# Patient Record
Sex: MALE | HISPANIC OR LATINO | Employment: FULL TIME
[De-identification: names, ages, dates, MRNs, and addresses within clinical notes are randomized per-mention and may not be internally consistent; named-entity substitution may affect disease eponyms.]

---

## 2020-11-26 ENCOUNTER — RECORDS - HEALTHEAST (OUTPATIENT)
Dept: ADMINISTRATIVE | Facility: OTHER | Age: 25
End: 2020-11-26

## 2020-12-03 ENCOUNTER — COMMUNICATION - HEALTHEAST (OUTPATIENT)
Dept: TELEHEALTH | Facility: CLINIC | Age: 25
End: 2020-12-03

## 2020-12-03 ENCOUNTER — OFFICE VISIT - HEALTHEAST (OUTPATIENT)
Dept: FAMILY MEDICINE | Facility: CLINIC | Age: 25
End: 2020-12-03

## 2020-12-03 DIAGNOSIS — K29.00 ACUTE GASTRITIS WITHOUT HEMORRHAGE, UNSPECIFIED GASTRITIS TYPE: ICD-10-CM

## 2020-12-03 LAB
ALBUMIN SERPL-MCNC: 4.3 G/DL (ref 3.5–5)
ALP SERPL-CCNC: 95 U/L (ref 45–120)
ALT SERPL W P-5'-P-CCNC: 169 U/L (ref 0–45)
ANION GAP SERPL CALCULATED.3IONS-SCNC: 8 MMOL/L (ref 5–18)
AST SERPL W P-5'-P-CCNC: 73 U/L (ref 0–40)
BASOPHILS # BLD AUTO: 0 THOU/UL (ref 0–0.2)
BASOPHILS NFR BLD AUTO: 0 % (ref 0–2)
BILIRUB SERPL-MCNC: 0.3 MG/DL (ref 0–1)
BUN SERPL-MCNC: 15 MG/DL (ref 8–22)
CALCIUM SERPL-MCNC: 9.3 MG/DL (ref 8.5–10.5)
CHLORIDE BLD-SCNC: 103 MMOL/L (ref 98–107)
CO2 SERPL-SCNC: 29 MMOL/L (ref 22–31)
CREAT SERPL-MCNC: 0.92 MG/DL (ref 0.7–1.3)
EOSINOPHIL # BLD AUTO: 0.1 THOU/UL (ref 0–0.4)
EOSINOPHIL NFR BLD AUTO: 2 % (ref 0–6)
ERYTHROCYTE [DISTWIDTH] IN BLOOD BY AUTOMATED COUNT: 12.7 % (ref 11–14.5)
GFR SERPL CREATININE-BSD FRML MDRD: >60 ML/MIN/1.73M2
GLUCOSE BLD-MCNC: 102 MG/DL (ref 70–125)
HCT VFR BLD AUTO: 40.5 % (ref 40–54)
HGB BLD-MCNC: 13.9 G/DL (ref 14–18)
LIPASE SERPL-CCNC: 26 U/L (ref 0–52)
LYMPHOCYTES # BLD AUTO: 1.5 THOU/UL (ref 0.8–4.4)
LYMPHOCYTES NFR BLD AUTO: 29 % (ref 20–40)
MCH RBC QN AUTO: 29.3 PG (ref 27–34)
MCHC RBC AUTO-ENTMCNC: 34.2 G/DL (ref 32–36)
MCV RBC AUTO: 85 FL (ref 80–100)
MONOCYTES # BLD AUTO: 0.4 THOU/UL (ref 0–0.9)
MONOCYTES NFR BLD AUTO: 8 % (ref 2–10)
NEUTROPHILS # BLD AUTO: 3 THOU/UL (ref 2–7.7)
NEUTROPHILS NFR BLD AUTO: 60 % (ref 50–70)
PLATELET # BLD AUTO: 337 THOU/UL (ref 140–440)
PMV BLD AUTO: 7.2 FL (ref 7–10)
POTASSIUM BLD-SCNC: 3.9 MMOL/L (ref 3.5–5)
PROT SERPL-MCNC: 7.2 G/DL (ref 6–8)
RBC # BLD AUTO: 4.74 MILL/UL (ref 4.4–6.2)
SODIUM SERPL-SCNC: 140 MMOL/L (ref 136–145)
WBC: 5 THOU/UL (ref 4–11)

## 2020-12-03 ASSESSMENT — MIFFLIN-ST. JEOR: SCORE: 1994.25

## 2020-12-04 ENCOUNTER — COMMUNICATION - HEALTHEAST (OUTPATIENT)
Dept: FAMILY MEDICINE | Facility: CLINIC | Age: 25
End: 2020-12-04

## 2020-12-16 ENCOUNTER — COMMUNICATION - HEALTHEAST (OUTPATIENT)
Dept: FAMILY MEDICINE | Facility: CLINIC | Age: 25
End: 2020-12-16

## 2021-05-03 ENCOUNTER — COMMUNICATION - HEALTHEAST (OUTPATIENT)
Dept: PHYSICAL MEDICINE AND REHAB | Facility: CLINIC | Age: 26
End: 2021-05-03

## 2021-06-05 VITALS
HEIGHT: 70 IN | BODY MASS INDEX: 31.94 KG/M2 | SYSTOLIC BLOOD PRESSURE: 118 MMHG | OXYGEN SATURATION: 96 % | DIASTOLIC BLOOD PRESSURE: 76 MMHG | TEMPERATURE: 98.2 F | HEART RATE: 83 BPM | WEIGHT: 223.1 LBS

## 2021-06-13 NOTE — TELEPHONE ENCOUNTER
We can reiterate that these labs are just slightly off, and not something I am worried about, but we can just recheck them in a few weeks.    TS

## 2021-06-13 NOTE — PROGRESS NOTES
"ASSESSMENT:  1. Acute gastritis without hemorrhage, unspecified gastritis type    I am not convinced that he had a pancreatitis at all, and I think he probably has more of a reflux or gastritis issue going on at this point.  His lipase was normal and his CT was really quite borderline and the radiologist suggested \"correlating with the lipase\" which was normal as mentioned.  I put him on some omeprazole twice a day every day for about a month and asked him to come back in about 4 to 6 weeks and see how he is doing on that.  We may need to send him to GI for an endoscopy if he is not getting better after that.  We also did recheck some of the labs that he had done in the hospital as well.      - omeprazole (PRILOSEC) 20 MG capsule; Take 1 capsule (20 mg total) by mouth 2 (two) times a day before meals.  Dispense: 60 capsule; Refill: 3  - Lipase  - HM1(CBC and Differential)  - Comprehensive Metabolic Panel          PLAN:  There are no Patient Instructions on file for this visit.    Orders Placed This Encounter   Procedures     Lipase     Comprehensive Metabolic Panel     HM1 (CBC with Diff)     There are no discontinued medications.    No follow-ups on file.    CHIEF COMPLAINT:  Chief Complaint   Patient presents with     Follow-up      NEW PATIENT INF [facility] ADMIT 11/26/20 DISCHARGED 11/27/20 MILD PANCREATITIS  ABDOMINAL PAIN  PATIENT ASSIGNED TO DR MARLEE SAUL THROUGH [facility] [place]. - fatigued - no want to do anything - was told he has amalrotation of organ? CT scan was done at [facility] in [place] then was sent to [facility]. Constant diarrhea x1 week       HISTORY OF PRESENT ILLNESS:  Froy is a 25 y.o. male presenting to the clinic today for a hospital follow-up.  He was having some epigastric discomfort back on November 25, and then it seemed to get a bit worse and on the next day, he went into the urgency room and was sent up to [facility] for CT and ended up getting admitted.  The thought that he might " "have some pancreatitis because of some equivocal finding on the CT scan, but he had a normal lipase and they kept him n.p.o. overnight and he seemed to get a bit better so he was discharged home on .  He has gotten some better a bit, but he still has some epigastric discomfort that occasionally will go straight through to the back.  He is eating more normal diet at this point.  He had had some diarrhea for some time but that seems to be slowly getting better as well.  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    REVIEW OF SYSTEMS:     All other systems are negative.    PFSH:    Reviewed    Patient is new patient to the clinic. Please see past medical history, surgical history, social history and family history, all of which were completed in their entirety today.     TOBACCO USE:  Social History     Tobacco Use   Smoking Status Former Smoker     Quit date: 2020     Years since quittin.0   Smokeless Tobacco Never Used       VITALS:  Vitals:    20 1239   BP: 118/76   Patient Site: Left Arm   Patient Position: Sitting   Cuff Size: Adult Large   Pulse: 83   Temp: 98.2  F (36.8  C)   SpO2: 96%   Weight: (!) 223 lb 1.6 oz (101.2 kg)   Height: 5' 9.75\" (1.772 m)     Wt Readings from Last 3 Encounters:   20 (!) 223 lb 1.6 oz (101.2 kg)     Body mass index is 32.24 kg/m .    PHYSICAL EXAM:  Constitutional:  Well appearing patient in no acute distress.  Vitals:  Per nursing notes.    HEENT:  Normocephalic, atraumatic.  Ears are clear bilaterally, with no fluid or redness, and landmarks visible.  Pupils are equal and reactive to light, extraocular muscles intact, visual fields are full.  Nose is normal, and oropharynx is clear without redness.    Neck is without lymphadenopathy.    Lungs:  Clear to auscultation bilaterally without wheezes, rales or " rhonchi.   Cardiac:  Regular rate and rhythm without murmurs, rubs, or gallops.     Abdomen is soft slightly tender in the epigastrium but no guarding or rebound is present.  Bowel sounds present all quadrants.    Legs show no cyanosis, clubbing or edema.  Palpation of the distal pulses are normal and symmetric.    Neurologic:  Cranial nerves II-XII intact.   Normal and symmetric reflexes in extremities, with normal strength and sensation.  Psychiatric:  Mood appropriate, memory intact.       ADDITIONAL HISTORY SUMMARIZED (2): 2  CARE EVERYWHERE/ EXTRA INFORMATION (1): None.   RADIOLOGY TESTS (1): 1  LABS (1): 1  CARDIOLOGY/MEDICINE TESTS (1): 0  INDEPENDENT REVIEW (2 each): None.     Total data points:4          MEDICATIONS:  Current Outpatient Medications   Medication Sig Dispense Refill     omeprazole (PRILOSEC) 20 MG capsule Take 1 capsule (20 mg total) by mouth 2 (two) times a day before meals. 60 capsule 3     No current facility-administered medications for this visit.

## 2021-06-13 NOTE — TELEPHONE ENCOUNTER
Test Results  Who is calling?:  Patient   Who ordered the test:  Bobby Myers MD  Type of test: Lab  Date of test:  12/03/20  Where was the test performed:  [facility]   What are your questions/concerns?:  Patient requested for results writer relayed providers message . Patient have some concerns about his lab results requested a call from provider .  Okay to leave a detailed message?:  No    Lipase is normal, liver tests are just borderline, but we can recheck them in a month when you come back in for a   recheck.      Please call with questions or contact us using [employer].     Sincerely,          Electronically signed by Bobby Myers MD

## 2021-06-21 NOTE — LETTER
Letter by Cindy Love at      Author: Cindy Love Service: -- Author Type: --    Filed:  Encounter Date: 5/3/2021 Status: (Other)         Froy Hernández  [address] [place] [id number]      May 3, 2021      Dear Mr. Stanislaw Hernández,    At [facility], we care about your health and well-being. Recently, we received a  referral to get you scheduled at the [facility]. We have attempted to  assist you in scheduling this appointment and have been unsuccessful in reaching you.    Please call our Intake line at your earliest convenience for assistance in scheduling an  Appointment and check in with you insurance if you need a referral to be seen from your  primary clinic. Thank you for choosing [facility].      Sincerely,        [facility] Intake team  [phone]

## 2021-06-21 NOTE — LETTER
Letter by Bobby Myers MD at      Author: Bobby Myers MD Service: -- Author Type: --    Filed:  Encounter Date: 12/4/2020 Status: (Other)         Froy Hernández  [address]  [place] [address]             December 4, 2020         Dear Mr. Stanislaw Hernández,    Below are the results from your recent visit:    Resulted Orders   Lipase   Result Value Ref Range    Lipase 26 0 - 52 U/L   Comprehensive Metabolic Panel   Result Value Ref Range    Sodium 140 136 - 145 mmol/L    Potassium 3.9 3.5 - 5.0 mmol/L    Chloride 103 98 - 107 mmol/L    CO2 29 22 - 31 mmol/L    Anion Gap, Calculation 8 5 - 18 mmol/L    Glucose 102 70 - 125 mg/dL    BUN 15 8 - 22 mg/dL    Creatinine 0.92 0.70 - 1.30 mg/dL    GFR MDRD Af Amer >60 >60 mL/min/1.73m2    GFR MDRD Non Af Amer >60 >60 mL/min/1.73m2    Bilirubin, Total 0.3 0.0 - 1.0 mg/dL    Calcium 9.3 8.5 - 10.5 mg/dL    Protein, Total 7.2 6.0 - 8.0 g/dL    Albumin 4.3 3.5 - 5.0 g/dL    Alkaline Phosphatase 95 45 - 120 U/L    AST 73 (H) 0 - 40 U/L     (H) 0 - 45 U/L    Narrative    Fasting Glucose reference range is 70-99 mg/dL per  American Diabetes Association (ADA) guidelines.   HM1 (CBC with Diff)   Result Value Ref Range    WBC 5.0 4.0 - 11.0 thou/uL    RBC 4.74 4.40 - 6.20 mill/uL    Hemoglobin 13.9 (L) 14.0 - 18.0 g/dL    Hematocrit 40.5 40.0 - 54.0 %    MCV 85 80 - 100 fL    MCH 29.3 27.0 - 34.0 pg    MCHC 34.2 32.0 - 36.0 g/dL    RDW 12.7 11.0 - 14.5 %    Platelets 337 140 - 440 thou/uL    MPV 7.2 7.0 - 10.0 fL    Neutrophils % 60 50 - 70 %    Lymphocytes % 29 20 - 40 %    Monocytes % 8 2 - 10 %    Eosinophils % 2 0 - 6 %    Basophils % 0 0 - 2 %    Neutrophils Absolute 3.0 2.0 - 7.7 thou/uL    Lymphocytes Absolute 1.5 0.8 - 4.4 thou/uL    Monocytes Absolute 0.4 0.0 - 0.9 thou/uL    Eosinophils Absolute 0.1 0.0 - 0.4 thou/uL    Basophils Absolute 0.0 0.0 - 0.2 thou/uL        Lipase is normal, liver tests are just borderline, but we can recheck them  in a month when you come back in for a  recheck.     Please call with questions or contact us using [employer].    Sincerely,        Electronically signed by Bobby Myers MD

## 2021-08-30 ENCOUNTER — OFFICE VISIT (OUTPATIENT)
Dept: FAMILY MEDICINE | Facility: CLINIC | Age: 26
End: 2021-08-30
Payer: OTHER GOVERNMENT

## 2021-08-30 VITALS
HEART RATE: 90 BPM | SYSTOLIC BLOOD PRESSURE: 114 MMHG | BODY MASS INDEX: 32.86 KG/M2 | DIASTOLIC BLOOD PRESSURE: 70 MMHG | WEIGHT: 229 LBS | OXYGEN SATURATION: 97 %

## 2021-08-30 DIAGNOSIS — L30.9 ECZEMA, UNSPECIFIED TYPE: Primary | ICD-10-CM

## 2021-08-30 DIAGNOSIS — M54.50 CHRONIC BILATERAL LOW BACK PAIN WITHOUT SCIATICA: ICD-10-CM

## 2021-08-30 DIAGNOSIS — M25.572 PAIN IN JOINT, ANKLE AND FOOT, LEFT: ICD-10-CM

## 2021-08-30 DIAGNOSIS — G89.29 CHRONIC BILATERAL LOW BACK PAIN WITHOUT SCIATICA: ICD-10-CM

## 2021-08-30 PROCEDURE — 99214 OFFICE O/P EST MOD 30 MIN: CPT | Performed by: FAMILY MEDICINE

## 2021-08-30 RX ORDER — FLUOCINONIDE CREAM (EMULSIFIED BASE) 0.5 MG/G
CREAM TOPICAL
Qty: 60 G | Refills: 0 | Status: SHIPPED | OUTPATIENT
Start: 2021-08-30 | End: 2023-01-25

## 2021-08-31 NOTE — PROGRESS NOTES
"    Assessment & Plan     Eczema, unspecified type    I gave him some of the cream as listed below.  We can try that for a while to see if it clears it up.  I would think that it would as it looks like a reactive dermatitis to something.  He is unsure exactly what it might have been from but regardless it should work for him and if it does not he will let us know.      - fluocinonide emulsified base 0.05 % external cream; Aoply to affected areas BID until clear    Chronic bilateral low back pain without sciatica    His back is been bothering him long enough now and is not getting been getting any better, but I think is reasonable to get an MRI scan of the back just to see what is going on anatomically, and make sure there is no arthritis or spondylolisthesis or any other structural abnormalities that would be concerning.  He does not have sciatica so I am not too concerned about a nerve impingement we can also see if that is part of the issue and follow-up on the results when the become available.      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      - Orthopedic  Referral; Future    Review of the result(s) of each unique test - MRI  Ordering of each unique test  Prescription drug management         BMI:   Estimated body mass index is 32.86 kg/m  as calculated from the following:    Height as of 4/25/21: 1.778 m (5' 10\").    Weight as of this encounter: 103.9 kg (229 lb).           No follow-ups on file.    Bobby Myers MD  [facility] BERNY Mcduffie is a 25 year old who presents for the following health issues     HPI " "    Patient is here today with several concerns.  First off he like to take a look at his back.  He continues to have some back pain but is not getting any better.  He has had some back pain ongoing for quite some time.  He has been through therapy and multiple other treatment attempts to try to get better but he is certainly still having a lot of pain in the lumbar area.  Sometimes will radiate down into his upper buttock but never anything past the gluteal crease or down into the legs.  No numbness or tingling or weakness into the legs, but he is just frustrated that is not getting any better.    Also has had some trouble with his left ankle.  He had surgery in [place] in 2020 because he injured it probably sounding like a eversion injury.  He does not think that they did anything to the ligaments but he states that there was some's \"bony tissue\" that was removed but now he is having a lot of problems with it and almost daily pain with it.    This is an issue for him both of these things above, with his active  duty and he just wants to have it looked at.    He also has had a bit of pruritic rash on his medial right upper arm for the last couple of days.  He is not sure when he might have been exposed to but it is quite bothersome to him.      Review of Systems   Constitutional, HEENT, cardiovascular, pulmonary, gi and gu systems are negative, except as otherwise noted.      Objective    /70 (BP Location: Left arm, Patient Position: Sitting, Cuff Size: Adult Large)   Pulse 90   Wt 103.9 kg (229 lb)   SpO2 97%   BMI 32.86 kg/m    Body mass index is 32.86 kg/m .  Physical Exam   GENERAL: healthy, alert and no distress  NECK: no adenopathy, no asymmetry, masses, or scars and thyroid normal to palpation  RESP: lungs clear to auscultation - no rales, rhonchi or wheezes  CV: regular rate and rhythm, normal S1 S2, no S3 or S4, no murmur, click or rub, no peripheral edema and peripheral pulses " strong  ABDOMEN: soft, nontender, no hepatosplenomegaly, no masses and bowel sounds normal  Oh back shows pain to flexion and extension as well as lateral flexion.  No sciatic notch tenderness.  Negative straight leg raise.  Good strength sensation reflexes in all 4 extremities.  His ankle shows some pain with inversion and some asymmetry just anterior to the medial malleolus with what seems to be a bit of a pit anteriorly to that which is different from the other side.  He has had a dermatitic rash as described above.    No results found for any visits on 08/30/21.

## 2021-09-08 ENCOUNTER — OFFICE VISIT (OUTPATIENT)
Dept: PODIATRY | Facility: CLINIC | Age: 26
End: 2021-09-08
Attending: FAMILY MEDICINE
Payer: OTHER GOVERNMENT

## 2021-09-08 VITALS — HEIGHT: 70 IN | BODY MASS INDEX: 31.78 KG/M2 | OXYGEN SATURATION: 96 % | HEART RATE: 89 BPM | WEIGHT: 222 LBS

## 2021-09-08 DIAGNOSIS — M25.572 PAIN IN JOINT, ANKLE AND FOOT, LEFT: ICD-10-CM

## 2021-09-08 PROCEDURE — 99244 OFF/OP CNSLTJ NEW/EST MOD 40: CPT | Performed by: PODIATRIST

## 2021-09-08 ASSESSMENT — PAIN SCALES - GENERAL: PAINLEVEL: MILD PAIN (3)

## 2021-09-08 ASSESSMENT — MIFFLIN-ST. JEOR: SCORE: 1993.24

## 2021-09-08 NOTE — LETTER
9/8/2021         RE: Froy Hernández  [address]  [place] [address]        Dear Colleague,    Thank you for referring your patient, Froy Hernández, to the [facility]. Please see a copy of my visit note below.    FOOT AND ANKLE SURGERY/PODIATRY CONSULT NOTE         ASSESSMENT:   Left foot and ankle pain      TREATMENT:  An x-ray of the left foot was taken today.  I informed the patient that it does appear that he may have some degenerative changes of the subtalar joint particularly the middle facet.  On the oblique view it does show a large circular radiolucent area within the sinus tarsi.  I am recommending an MRI of the left foot to rule out bone tumors or any other pathology that is not definitive on plain film.  Also want to examine the anterior talofibular ligament to rule out rupture.  The patient is to return to the clinic in 1 week to discuss the findings of the MRI.        HPI: I was asked to see Froy Hernández today to evaluate chronic left foot pain.  The patient indicated that he has had severe pain involving his left foot for several years.  He is extremely active.  He likes to play basketball, football and other sports activities.  He has suffered several ankle sprains.  He subsequently had ankle arthroscopy of the left ankle after he was informed he had some degenerative changes in the ankle joint.  He stated this surgical procedure was unsuccessful eliminating his pain.  He stated that his pain is has gotten progressively worse over the years.  It is difficult to walk and weight-bear without pain.  He describes it as a severe aching type pain.  It does alter his gait quite a bit.  He has noticed some swelling of the left foot and ankle as well.  He has not had any recent trauma to the left foot or the left ankle..  The patient was seen in consultation at the request of Bobby Myers MD for evaluation and treatment of left  foot and left ankle pain.     History reviewed. No pertinent past medical history.    Social History     Socioeconomic History     Marital status:      Spouse name: Not on file     Number of children: Not on file     Years of education: Not on file     Highest education level: Not on file   Occupational History     Not on file   Tobacco Use     Smoking status: Former Smoker     Quit date: 2020     Years since quittin.7     Smokeless tobacco: Never Used     Tobacco comment: e-cig   Substance and Sexual Activity     Alcohol use: Not Currently     Drug use: Not on file     Sexual activity: Yes     Partners: Female   Other Topics Concern     Not on file   Social History Narrative     Not on file     Social Determinants of Health     Financial Resource Strain:      Difficulty of Paying Living Expenses:    Food Insecurity:      Worried About Running Out of Food in the Last Year:      Ran Out of Food in the Last Year:    Transportation Needs:      Lack of Transportation (Medical):      Lack of Transportation (Non-Medical):    Physical Activity:      Days of Exercise per Week:      Minutes of Exercise per Session:    Stress:      Feeling of Stress :    Social Connections:      Frequency of Communication with Friends and Family:      Frequency of Social Gatherings with Friends and Family:      Attends [employer] Services:      Active Member of Clubs or Organizations:      Attends Club or Organization Meetings:      Marital Status:    Intimate Partner Violence:      Fear of Current or Ex-Partner:      Emotionally Abused:      Physically Abused:      Sexually Abused:           Allergies   Allergen Reactions     Penicillins Unknown     Other reaction(s): *Unknown          Current Outpatient Medications:      fluocinonide emulsified base 0.05 % external cream, Aoply to affected areas BID until clear, Disp: 60 g, Rfl: 0     No family history on file.     Social History     Socioeconomic History     Marital status:       Spouse name: Not on file     Number of children: Not on file     Years of education: Not on file     Highest education level: Not on file   Occupational History     Not on file   Tobacco Use     Smoking status: Former Smoker     Quit date: 2020     Years since quittin.7     Smokeless tobacco: Never Used     Tobacco comment: e-cig   Substance and Sexual Activity     Alcohol use: Not Currently     Drug use: Not on file     Sexual activity: Yes     Partners: Female   Other Topics Concern     Not on file   Social History Narrative     Not on file     Social Determinants of Health     Financial Resource Strain:      Difficulty of Paying Living Expenses:    Food Insecurity:      Worried About Running Out of Food in the Last Year:      Ran Out of Food in the Last Year:    Transportation Needs:      Lack of Transportation (Medical):      Lack of Transportation (Non-Medical):    Physical Activity:      Days of Exercise per Week:      Minutes of Exercise per Session:    Stress:      Feeling of Stress :    Social Connections:      Frequency of Communication with Friends and Family:      Frequency of Social Gatherings with Friends and Family:      Attends Worship Services:      Active Member of Clubs or Organizations:      Attends Club or Organization Meetings:      Marital Status:    Intimate Partner Violence:      Fear of Current or Ex-Partner:      Emotionally Abused:      Physically Abused:      Sexually Abused:         Review of Systems - Patient denies fever, chills, rash, wound, stiffness, limping, numbness, weakness, heart burn, blood in stool, chest pain with activity, calf pain when walking, shortness of breath with activity, chronic cough, easy bleeding/bruising, swelling of ankles, excessive thirst, fatigue, depression, anxiety.  Patient admits to left foot and left ankle pain.      OBJECTIVE:  Appearance: alert, well appearing, and in no distress.    There were no vitals taken for this  visit.     There is no height or weight on file to calculate BMI.     General appearance: Patient is alert and fully cooperative with history & exam.  No sign of distress is noted during the visit.  Psychiatric: Affect is pleasant & appropriate.  Patient appears motivated to improve health.  Respiratory: Breathing is regular & unlabored while sitting.  HEENT: Hearing is intact to spoken word.  Speech is clear.  No gross evidence of visual impairment that would impact ambulation.    Vascular: Dorsalis pedis and posterior tibial pulses are palpable. There is pedal hair growth bilaterally.  CFT < 3 sec from anterior tibial surface to distal digits bilaterally. There is no appreciable edema noted.  Dermatologic: Turgor and texture are within normal limits. No coloration or temperature changes. No primary or secondary lesions noted.  Neurologic: All epicritic and proprioceptive sensations are grossly intact bilaterally.  Musculoskeletal: All active and passive ankle, subtalar, midtarsal, and 1st MPJ range of motion are grossly intact without pain or crepitus, with the exception of left subtalar joint. Manual muscle strength is within normal limits bilaterally. All dorsiflexors, plantarflexors, invertors, evertors are intact bilaterally. Tenderness present to left foot near the sinus tarsi and also the anterior talofibular ligament on palpation.  Tenderness to the left foot near the sinus tarsi with range of motion. Calf is soft/non-tender without warmth/induration    Imaging:            No results found.   No results found.       Jorge Shultz DPM  [facility] Foot & Ankle Surgery/Podiatry         Again, thank you for allowing me to participate in the care of your patient.        Sincerely,        Jorge Renee DPM

## 2021-09-08 NOTE — PROGRESS NOTES
FOOT AND ANKLE SURGERY/PODIATRY CONSULT NOTE         ASSESSMENT:   Left foot and ankle pain      TREATMENT:  An x-ray of the left foot was taken today.  I informed the patient that it does appear that he may have some degenerative changes of the subtalar joint particularly the middle facet.  On the oblique view it does show a large circular radiolucent area within the sinus tarsi.  I am recommending an MRI of the left foot to rule out bone tumors or any other pathology that is not definitive on plain film.  Also want to examine the anterior talofibular ligament to rule out rupture.  The patient is to return to the clinic in 1 week to discuss the findings of the MRI.        HPI: I was asked to see Froy Hernández today to evaluate chronic left foot pain.  The patient indicated that he has had severe pain involving his left foot for several years.  He is extremely active.  He likes to play basketball, football and other sports activities.  He has suffered several ankle sprains.  He subsequently had ankle arthroscopy of the left ankle after he was informed he had some degenerative changes in the ankle joint.  He stated this surgical procedure was unsuccessful eliminating his pain.  He stated that his pain is has gotten progressively worse over the years.  It is difficult to walk and weight-bear without pain.  He describes it as a severe aching type pain.  It does alter his gait quite a bit.  He has noticed some swelling of the left foot and ankle as well.  He has not had any recent trauma to the left foot or the left ankle..  The patient was seen in consultation at the request of Bobby Myers MD for evaluation and treatment of left foot and left ankle pain.     History reviewed. No pertinent past medical history.    Social History     Socioeconomic History     Marital status:      Spouse name: Not on file     Number of children: Not on file     Years of education: Not on file     Highest education level:  Not on file   Occupational History     Not on file   Tobacco Use     Smoking status: Former Smoker     Quit date: 2020     Years since quittin.7     Smokeless tobacco: Never Used     Tobacco comment: e-cig   Substance and Sexual Activity     Alcohol use: Not Currently     Drug use: Not on file     Sexual activity: Yes     Partners: Female   Other Topics Concern     Not on file   Social History Narrative     Not on file     Social Determinants of Health     Financial Resource Strain:      Difficulty of Paying Living Expenses:    Food Insecurity:      Worried About Running Out of Food in the Last Year:      Ran Out of Food in the Last Year:    Transportation Needs:      Lack of Transportation (Medical):      Lack of Transportation (Non-Medical):    Physical Activity:      Days of Exercise per Week:      Minutes of Exercise per Session:    Stress:      Feeling of Stress :    Social Connections:      Frequency of Communication with Friends and Family:      Frequency of Social Gatherings with Friends and Family:      Attends Mandaeism Services:      Active Member of Clubs or Organizations:      Attends Club or Organization Meetings:      Marital Status:    Intimate Partner Violence:      Fear of Current or Ex-Partner:      Emotionally Abused:      Physically Abused:      Sexually Abused:           Allergies   Allergen Reactions     Penicillins Unknown     Other reaction(s): *Unknown          Current Outpatient Medications:      fluocinonide emulsified base 0.05 % external cream, Aoply to affected areas BID until clear, Disp: 60 g, Rfl: 0     No family history on file.     Social History     Socioeconomic History     Marital status:      Spouse name: Not on file     Number of children: Not on file     Years of education: Not on file     Highest education level: Not on file   Occupational History     Not on file   Tobacco Use     Smoking status: Former Smoker     Quit date: 2020     Years since  quittin.7     Smokeless tobacco: Never Used     Tobacco comment: e-cig   Substance and Sexual Activity     Alcohol use: Not Currently     Drug use: Not on file     Sexual activity: Yes     Partners: Female   Other Topics Concern     Not on file   Social History Narrative     Not on file     Social Determinants of Health     Financial Resource Strain:      Difficulty of Paying Living Expenses:    Food Insecurity:      Worried About Running Out of Food in the Last Year:      Ran Out of Food in the Last Year:    Transportation Needs:      Lack of Transportation (Medical):      Lack of Transportation (Non-Medical):    Physical Activity:      Days of Exercise per Week:      Minutes of Exercise per Session:    Stress:      Feeling of Stress :    Social Connections:      Frequency of Communication with Friends and Family:      Frequency of Social Gatherings with Friends and Family:      Attends [employer] Services:      Active Member of Clubs or Organizations:      Attends Club or Organization Meetings:      Marital Status:    Intimate Partner Violence:      Fear of Current or Ex-Partner:      Emotionally Abused:      Physically Abused:      Sexually Abused:         Review of Systems - Patient denies fever, chills, rash, wound, stiffness, limping, numbness, weakness, heart burn, blood in stool, chest pain with activity, calf pain when walking, shortness of breath with activity, chronic cough, easy bleeding/bruising, swelling of ankles, excessive thirst, fatigue, depression, anxiety.  Patient admits to left foot and left ankle pain.      OBJECTIVE:  Appearance: alert, well appearing, and in no distress.    There were no vitals taken for this visit.     There is no height or weight on file to calculate BMI.     General appearance: Patient is alert and fully cooperative with history & exam.  No sign of distress is noted during the visit.  Psychiatric: Affect is pleasant & appropriate.  Patient appears motivated to improve  health.  Respiratory: Breathing is regular & unlabored while sitting.  HEENT: Hearing is intact to spoken word.  Speech is clear.  No gross evidence of visual impairment that would impact ambulation.    Vascular: Dorsalis pedis and posterior tibial pulses are palpable. There is pedal hair growth bilaterally.  CFT < 3 sec from anterior tibial surface to distal digits bilaterally. There is no appreciable edema noted.  Dermatologic: Turgor and texture are within normal limits. No coloration or temperature changes. No primary or secondary lesions noted.  Neurologic: All epicritic and proprioceptive sensations are grossly intact bilaterally.  Musculoskeletal: All active and passive ankle, subtalar, midtarsal, and 1st MPJ range of motion are grossly intact without pain or crepitus, with the exception of left subtalar joint. Manual muscle strength is within normal limits bilaterally. All dorsiflexors, plantarflexors, invertors, evertors are intact bilaterally. Tenderness present to left foot near the sinus tarsi and also the anterior talofibular ligament on palpation.  Tenderness to the left foot near the sinus tarsi with range of motion. Calf is soft/non-tender without warmth/induration    Imaging:            No results found.   No results found.       Jorge Shultz DPM  [facility] Foot & Ankle Surgery/Podiatry

## 2021-09-08 NOTE — PATIENT INSTRUCTIONS
Magnetic Resonance Imaging (MRI)     You will be asked to hold very still during the scan.     Magnetic resonance imaging (MRI) is a test that lets your doctor see detailed pictures of the inside of your body. MRI combines the use of strong magnets and radio waves to form an MRI image.  How do I get ready for an MRI?    Follow any directions you are given for not eating or drinking before the test.    Ask your provider if you should stop taking any medicine before the test.    Follow your normal daily routine unless your provider tells you otherwise.    You'll be asked to remove your watch, jewelry, hearing aids, credit cards, pens, pocket knives, eyeglasses, and other metal objects.    You may be asked to remove your makeup. Makeup may contain some metal.    Most MRI tests take 30 to 60 minutes. Depending on the type of MRI you are having, the test may take longer. Give yourself extra time to check in.      MRI uses strong magnets. Metal is affected by magnets and can distort the image. The magnet used in MRI can cause metal objects in your body to move. If you have a metal implant, you may not be able to have an MRI unless the implant is certified as MRI safe. People with these implants should not have an MRI:    Ear (cochlear) implants    Certain clips used for brain aneurysms    Certain metal coils put in blood vessels    Most defibrillators    Most pacemakers  Be sure to tell the radiologist or technologist if you:    Have had any previous surgeries    Have a pacemaker, surgical clips, metal plate or pins, an artificial joint, staples or screws, ear (cochlear) implants, or other implants    Wear a medicated adhesive patch    Have metal splinters in your body    Have implanted nerve stimulators or drug-infusion ports    Have tattoos or body piercings. Some tattoo inks contain metal.    Work with metal    Have braces. You must remove any dental work.    Have a bullet or other metal in your body  Also tell the  radiologist or technologist if you:    Are pregnant or think you may be    Are afraid of small, enclosed spaces (claustrophobic)    Are allergic to X-ray dye (contrast medium), iodine, shellfish, or any medicines    Have other allergies    Are breastfeeding    Have a history of cancer    Have any serious health problems. This includes kidney disease or a liver transplant. You may not be able to have the contrast material used for MRI.   What happens during an MRI?    You may be asked to wear a hospital gown.    You may be given earplugs to wear if you need them.    You may be injected with a special dye (contrast) that improves the MRI image.     You ll lie down on a platform that slides into the magnet.  What happens after an MRI?    You can get back to normal activities right away. If you were given contrast, it will pass naturally through your body within a day. You may be told to drink more water or other fluids during this time.     Your doctor will discuss the test results with you during a follow-up appointment or over the phone.    Your next appointment is: __________________  Date Last Reviewed: 6/1/2017 2000-2017 [employer]. [address] [place] [id number]. All rights reserved. This information is not intended as a substitute for professional medical care. Always follow your healthcare professional's instructions.

## 2021-09-15 ENCOUNTER — HOSPITAL ENCOUNTER (OUTPATIENT)
Dept: MRI IMAGING | Facility: CLINIC | Age: 26
Discharge: HOME | End: 2021-09-15
Attending: PODIATRIST | Admitting: PODIATRIST
Payer: OTHER GOVERNMENT

## 2021-09-15 DIAGNOSIS — M25.572 PAIN IN JOINT, ANKLE AND FOOT, LEFT: ICD-10-CM

## 2021-09-15 PROCEDURE — 73718 MRI LOWER EXTREMITY W/O DYE: CPT | Mod: LT

## 2021-09-20 ENCOUNTER — OFFICE VISIT (OUTPATIENT)
Dept: PODIATRY | Facility: CLINIC | Age: 26
End: 2021-09-20
Payer: OTHER GOVERNMENT

## 2021-09-20 VITALS — HEIGHT: 70 IN | HEART RATE: 76 BPM | BODY MASS INDEX: 32.21 KG/M2 | WEIGHT: 225 LBS | OXYGEN SATURATION: 96 %

## 2021-09-20 DIAGNOSIS — M25.572 PAIN IN JOINT, ANKLE AND FOOT, LEFT: Primary | ICD-10-CM

## 2021-09-20 PROCEDURE — 99213 OFFICE O/P EST LOW 20 MIN: CPT | Performed by: PODIATRIST

## 2021-09-20 ASSESSMENT — MIFFLIN-ST. JEOR: SCORE: 2006.84

## 2021-09-20 ASSESSMENT — PAIN SCALES - GENERAL: PAINLEVEL: MILD PAIN (3)

## 2021-09-20 NOTE — LETTER
2021         RE: Froy Hernández  [address] [address]  [address] [place] [id number]        Dear Colleague,    Thank you for referring your patient, Froy Hernández, to the [facility]. Please see a copy of my visit note below.    FOOT AND ANKLE SURGERY/PODIATRY Progress Note        ASSESSMENT:   Chronic left foot pain    HPI: Froy Hernández was seen again today with continued complaints of moderate to severe left foot pain.  The patient indicated once again that the pain is located near the calcaneocuboid joint and the talonavicular joint of the left foot.  X-rays were negative for any significant osseous or soft tissue abnormalities.  MRIs were negative for any abnormalities.  The x-rays did show some mild dorsal spurring of the talar head..      History reviewed. No pertinent past medical history.     Past Surgical History:   Procedure Laterality Date     ARTHROSCOPY ANKLE         Allergies   Allergen Reactions     Penicillins Unknown     Other reaction(s): *Unknown         Current Outpatient Medications:      fluocinonide emulsified base 0.05 % external cream, Aoply to affected areas BID until clear, Disp: 60 g, Rfl: 0    History reviewed. No pertinent family history.    Social History     Socioeconomic History     Marital status:      Spouse name: Not on file     Number of children: Not on file     Years of education: Not on file     Highest education level: Not on file   Occupational History     Not on file   Tobacco Use     Smoking status: Former Smoker     Quit date: 2020     Years since quittin.8     Smokeless tobacco: Never Used     Tobacco comment: e-cig   Substance and Sexual Activity     Alcohol use: Not Currently     Drug use: Not on file     Sexual activity: Yes     Partners: Female   Other Topics Concern     Not on file   Social History Narrative     Not on file     Social Determinants of Health     Financial Resource Strain:   "    Difficulty of Paying Living Expenses:    Food Insecurity:      Worried About Running Out of Food in the Last Year:      Ran Out of Food in the Last Year:    Transportation Needs:      Lack of Transportation (Medical):      Lack of Transportation (Non-Medical):    Physical Activity:      Days of Exercise per Week:      Minutes of Exercise per Session:    Stress:      Feeling of Stress :    Social Connections:      Frequency of Communication with Friends and Family:      Frequency of Social Gatherings with Friends and Family:      Attends Protestant Services:      Active Member of Clubs or Organizations:      Attends Club or Organization Meetings:      Marital Status:    Intimate Partner Violence:      Fear of Current or Ex-Partner:      Emotionally Abused:      Physically Abused:      Sexually Abused:        10 point Review of Systems is negative      Pulse 76   Ht 1.778 m (5' 10\")   Wt 102.1 kg (225 lb)   SpO2 96%   BMI 32.28 kg/m      BMI= Body mass index is 32.28 kg/m .    OBJECTIVE:  General appearance: Patient is alert and fully cooperative with history & exam.  No sign of distress is noted during the visit.  Vascular: Dorsalis pedis and posterior tibial pulses are palpable. There is pedal hair growth bilaterally.  CFT < 3 sec from anterior tibial surface to distal digits bilaterally. There is no appreciable edema noted.  Dermatologic: Turgor and texture are within normal limits. No coloration or temperature changes. No primary or secondary lesions noted.  Neurologic: All epicritic and proprioceptive sensations are grossly intact bilaterally.  Musculoskeletal: All active and passive ankle, subtalar, midtarsal, and 1st MPJ range of motion are grossly intact without pain or crepitus, with the exception of left subtalar joint. Manual muscle strength is within normal limits bilaterally. All dorsiflexors, plantarflexors, invertors, evertors are intact bilaterally. Tenderness present to left foot near the sinus " tarsi and also the anterior talofibular ligament on palpation.  Tenderness to the left foot near the sinus tarsi with range of motion. Calf is soft/non-tender without warmth/induration    Imaging:         MR Foot Left w/o Contrast    Result Date: 9/15/2021  EXAM: MR FOOT LEFT W/O CONTRAST LOCATION: [facility] DATE/TIME: 9/15/2021 6:42 AM INDICATION: Foot pain, chronic, osteoarthritis suspected. COMPARISON: None. TECHNIQUE: Unenhanced. FINDINGS: JOINTS AND BONES: -No acute left foot fracture. No evidence for stress fracture. Hindfoot valgus. Dorsal spurring talar head. No focal ankle, hindfoot or midfoot chondral defect identified. No significant juxta articular marrow edema. No subchondral cyst formation. TENDONS: -Distal peroneus longus and brevis tendons intact without tendinopathy or tear or lateral tenosynovitis. Superior peroneal retinaculum intact. Posterior tibialis, flexor digitorum longus and flexor hallucis longus tendons intact without medial tenosynovitis. Anterior ankle extensors intact without tenosynovitis. Distal Achilles tendon appears intact where imaged. LIGAMENTS: -Lisfranc ligament: Intact. No subluxation. Intact anterior talofibular ligament, calcaneofibular ligament, posterior talofibular ligament and high ankle ligaments. No deltoid ligament sprain. MUSCLES AND SOFT TISSUES: -Intrinsic muscle bulk normal. No muscle edema. No space-occupying soft tissue mass or focal fluid collection. Preservation of sinus tarsi fat signal.     IMPRESSION: 1.  No evidence for acute left foot fracture. No stress fracture. 2.  Hindfoot valgus. Dorsal spurring talar head. 3.  No acute left ankle sprain identified. Intact Lisfranc ligament.    XR Foot 3 Views Standing Left    Result Date: 9/8/2021  Exam performed: Left foot Indication: Foot pain Report: The AP and lateral oblique view shows some joint space narrowing of the middle facet of the subtalar joint.  There is also an area of  radiolucency within the sinus tarsi of the left foot.  The lateral view of the left foot shows joint space narrowing of the medial facet of the subtalar joint. 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  His calcaneocuboid joint show some significant joint space narrowing which could be the source of his pain.  I told the patient many times if there is degenerative changes causing pain arthrodesis is required.  I am not ready to move forward with any surgical plans at this time.  I would like for the patient to see my partner Dr. Charlie Russell for another opinion and attempt to determine the source of his pain.        Jorge Renee; TOMMIE  [facility] Foot & Ankle Surgery/Podiatry         Again, thank you for allowing me to participate in the care of your patient.        Sincerely,        Jorge Renee DPM

## 2021-09-20 NOTE — PROGRESS NOTES
FOOT AND ANKLE SURGERY/PODIATRY Progress Note        ASSESSMENT:   Chronic left foot pain    HPI: Froy Hernández was seen again today with continued complaints of moderate to severe left foot pain.  The patient indicated once again that the pain is located near the calcaneocuboid joint and the talonavicular joint of the left foot.  X-rays were negative for any significant osseous or soft tissue abnormalities.  MRIs were negative for any abnormalities.  The x-rays did show some mild dorsal spurring of the talar head..      History reviewed. No pertinent past medical history.     Past Surgical History:   Procedure Laterality Date     ARTHROSCOPY ANKLE         Allergies   Allergen Reactions     Penicillins Unknown     Other reaction(s): *Unknown         Current Outpatient Medications:      fluocinonide emulsified base 0.05 % external cream, Aoply to affected areas BID until clear, Disp: 60 g, Rfl: 0    History reviewed. No pertinent family history.    Social History     Socioeconomic History     Marital status:      Spouse name: Not on file     Number of children: Not on file     Years of education: Not on file     Highest education level: Not on file   Occupational History     Not on file   Tobacco Use     Smoking status: Former Smoker     Quit date: 2020     Years since quittin.8     Smokeless tobacco: Never Used     Tobacco comment: e-cig   Substance and Sexual Activity     Alcohol use: Not Currently     Drug use: Not on file     Sexual activity: Yes     Partners: Female   Other Topics Concern     Not on file   Social History Narrative     Not on file     Social Determinants of Health     Financial Resource Strain:      Difficulty of Paying Living Expenses:    Food Insecurity:      Worried About Running Out of Food in the Last Year:      Ran Out of Food in the Last Year:    Transportation Needs:      Lack of Transportation (Medical):      Lack of Transportation (Non-Medical):    Physical  "Activity:      Days of Exercise per Week:      Minutes of Exercise per Session:    Stress:      Feeling of Stress :    Social Connections:      Frequency of Communication with Friends and Family:      Frequency of Social Gatherings with Friends and Family:      Attends Nondenominational Services:      Active Member of Clubs or Organizations:      Attends Club or Organization Meetings:      Marital Status:    Intimate Partner Violence:      Fear of Current or Ex-Partner:      Emotionally Abused:      Physically Abused:      Sexually Abused:        10 point Review of Systems is negative      Pulse 76   Ht 1.778 m (5' 10\")   Wt 102.1 kg (225 lb)   SpO2 96%   BMI 32.28 kg/m      BMI= Body mass index is 32.28 kg/m .    OBJECTIVE:  General appearance: Patient is alert and fully cooperative with history & exam.  No sign of distress is noted during the visit.  Vascular: Dorsalis pedis and posterior tibial pulses are palpable. There is pedal hair growth bilaterally.  CFT < 3 sec from anterior tibial surface to distal digits bilaterally. There is no appreciable edema noted.  Dermatologic: Turgor and texture are within normal limits. No coloration or temperature changes. No primary or secondary lesions noted.  Neurologic: All epicritic and proprioceptive sensations are grossly intact bilaterally.  Musculoskeletal: All active and passive ankle, subtalar, midtarsal, and 1st MPJ range of motion are grossly intact without pain or crepitus, with the exception of left subtalar joint. Manual muscle strength is within normal limits bilaterally. All dorsiflexors, plantarflexors, invertors, evertors are intact bilaterally. Tenderness present to left foot near the sinus tarsi and also the anterior talofibular ligament on palpation.  Tenderness to the left foot near the sinus tarsi with range of motion. Calf is soft/non-tender without warmth/induration    Imaging:         MR Foot Left w/o Contrast    Result Date: 9/15/2021  EXAM: MR FOOT LEFT " W/O CONTRAST LOCATION: [facility] DATE/TIME: 9/15/2021 6:42 AM INDICATION: Foot pain, chronic, osteoarthritis suspected. COMPARISON: None. TECHNIQUE: Unenhanced. FINDINGS: JOINTS AND BONES: -No acute left foot fracture. No evidence for stress fracture. Hindfoot valgus. Dorsal spurring talar head. No focal ankle, hindfoot or midfoot chondral defect identified. No significant juxta articular marrow edema. No subchondral cyst formation. TENDONS: -Distal peroneus longus and brevis tendons intact without tendinopathy or tear or lateral tenosynovitis. Superior peroneal retinaculum intact. Posterior tibialis, flexor digitorum longus and flexor hallucis longus tendons intact without medial tenosynovitis. Anterior ankle extensors intact without tenosynovitis. Distal Achilles tendon appears intact where imaged. LIGAMENTS: -Lisfranc ligament: Intact. No subluxation. Intact anterior talofibular ligament, calcaneofibular ligament, posterior talofibular ligament and high ankle ligaments. No deltoid ligament sprain. MUSCLES AND SOFT TISSUES: -Intrinsic muscle bulk normal. No muscle edema. No space-occupying soft tissue mass or focal fluid collection. Preservation of sinus tarsi fat signal.     IMPRESSION: 1.  No evidence for acute left foot fracture. No stress fracture. 2.  Hindfoot valgus. Dorsal spurring talar head. 3.  No acute left ankle sprain identified. Intact Lisfranc ligament.    XR Foot 3 Views Standing Left    Result Date: 9/8/2021  Exam performed: Left foot Indication: Foot pain Report: The AP and lateral oblique view shows some joint space narrowing of the middle facet of the subtalar joint.  There is also an area of radiolucency within the sinus tarsi of the left foot.  The lateral view of the left foot shows joint space narrowing of the medial facet of the subtalar joint. 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  His calcaneocuboid joint show some significant joint space narrowing which could be the source of his pain.  I told the patient many times if there is degenerative changes causing pain arthrodesis is required.  I am not ready to move forward with any surgical plans at this time.  I would like for the patient to see my partner Dr. Charlie Russell for another opinion and attempt to determine the source of his pain.        Jorge Renee; TOMMIE  [facility] Foot & Ankle Surgery/Podiatry

## 2021-09-27 PROBLEM — J30.9 ALLERGIC RHINITIS: Status: ACTIVE | Noted: 2021-09-27

## 2021-09-27 PROBLEM — A15.9: Status: ACTIVE | Noted: 2021-09-27

## 2021-09-27 PROBLEM — S83.209A TEAR OF MENISCUS OF KNEE: Status: ACTIVE | Noted: 2021-09-27

## 2021-09-27 PROBLEM — B07.9 VIRAL WARTS: Status: ACTIVE | Noted: 2021-09-27

## 2021-09-27 PROBLEM — Z16.341: Status: ACTIVE | Noted: 2021-09-27

## 2021-09-27 PROBLEM — H54.7 VISUAL IMPAIRMENT: Status: ACTIVE | Noted: 2021-09-27

## 2021-09-27 PROBLEM — M25.572 PAIN IN LEFT ANKLE AND JOINTS OF LEFT FOOT: Status: ACTIVE | Noted: 2019-01-29

## 2021-09-27 PROBLEM — R76.11 NONSPECIFIC REACTION TO TUBERCULIN TEST: Status: ACTIVE | Noted: 2021-09-27

## 2021-09-27 PROBLEM — S83.511A SPRAIN OF ANTERIOR CRUCIATE LIGAMENT OF RIGHT KNEE: Status: ACTIVE | Noted: 2017-10-24

## 2021-09-27 PROBLEM — G44.329 CHRONIC POST-TRAUMATIC HEADACHE: Status: ACTIVE | Noted: 2021-09-27

## 2021-09-27 PROBLEM — H10.45 CHRONIC ALLERGIC CONJUNCTIVITIS: Status: ACTIVE | Noted: 2021-09-27

## 2021-09-28 ENCOUNTER — OFFICE VISIT (OUTPATIENT)
Dept: PODIATRY | Facility: CLINIC | Age: 26
End: 2021-09-28
Payer: OTHER GOVERNMENT

## 2021-09-28 VITALS
HEIGHT: 70 IN | WEIGHT: 225 LBS | BODY MASS INDEX: 32.21 KG/M2 | DIASTOLIC BLOOD PRESSURE: 70 MMHG | SYSTOLIC BLOOD PRESSURE: 106 MMHG

## 2021-09-28 DIAGNOSIS — M25.572 SINUS TARSI SYNDROME, LEFT: Primary | ICD-10-CM

## 2021-09-28 PROCEDURE — 20605 DRAIN/INJ JOINT/BURSA W/O US: CPT | Mod: LT | Performed by: PODIATRIST

## 2021-09-28 RX ORDER — TRIAMCINOLONE ACETONIDE 40 MG/ML
40 INJECTION, SUSPENSION INTRA-ARTICULAR; INTRAMUSCULAR ONCE
Status: COMPLETED | OUTPATIENT
Start: 2021-09-28 | End: 2021-09-28

## 2021-09-28 RX ADMIN — TRIAMCINOLONE ACETONIDE 40 MG: 40 INJECTION, SUSPENSION INTRA-ARTICULAR; INTRAMUSCULAR at 11:48

## 2021-09-28 ASSESSMENT — MIFFLIN-ST. JEOR: SCORE: 2006.84

## 2021-09-28 NOTE — PATIENT INSTRUCTIONS
Limited walking with the CAM boot on the left foot     Today in clinic you received an injection of Kenalog mixed with lidocaine. As the lidocaine wears off you may experience more pain. This pain may last up to several days as the injection sets up. It can take up to a week before you feel the full benefit of your injection.    Please call our office if you begin to see signs of an infection. Signs or symptoms of an infection may include: fever, redness, warmth, or swelling at the injection sight, and discolored drainage. [phone].

## 2021-09-28 NOTE — LETTER
9/28/2021         RE: Froy Hernández  [address]  [address] [place] [id number]        Dear Colleague,    Thank you for referring your patient, Froy Hernández, to the [facility]. Please see a copy of my visit note below.        FOOT AND ANKLE SURGERY/PODIATRY PROGRESS NOTE        ASSESSMENT: Sinus Tarsi Syndrome left      TREATMENT:  -There is pain to palpation along the left sinus tarsi. There is mild tenderness along the anterior left ankle joint, no pain with ROM of the ankle. Pain with inversion of the left STJ. MRI report is grossly normal, dorsal talar head spurring. I reviewed the patient's x-rays which are negative for STJ DJD.    -Based on the above, I recommend a STJ injection today which appears to be the source of his pain. He consents to a steroid injection.     -Injected 1 cc Kenalog 40/0.5 cc 2% lidocaine plain left sinus tarsi. Patient tolerated the procedure well. Band aid applied.     -CAM boot dispensed today, recommend limited walking. Avoid running.     -Patient's questions invited and answered. Patient to return to clinic in 3 week(s) for re-evaluation. He was encouraged to call my office with any further questions or concerns.     Charlie Russell DPM  [facility] Podiatry/Foot & Ankle Surgery  [phone]      HPI: Froy Hernández was seen today at the request of Dr. Renee for left foot pain. The patient states he has had on-going pain for the past 2-3 years. Admits to a history of recurrent ankle sprains. He did undergo an ankle arthroscopy procedure about two years ago which seemed to help the ankle pain. He now complains of pain more into the dorsal lateral foot. Recent x-rays and MRI of the left foot did not indicate significant pathology.     No past medical history on file.    Past Surgical History:   Procedure Laterality Date     ARTHROSCOPY ANKLE         Allergies   Allergen Reactions     Penicillins  "Unknown     Other reaction(s): *Unknown         Current Outpatient Medications:      fluocinonide emulsified base 0.05 % external cream, Aoply to affected areas BID until clear, Disp: 60 g, Rfl: 0    No family history on file.    Social History     Socioeconomic History     Marital status:      Spouse name: Not on file     Number of children: Not on file     Years of education: Not on file     Highest education level: Not on file   Occupational History     Not on file   Tobacco Use     Smoking status: Former Smoker     Quit date: 2020     Years since quittin.8     Smokeless tobacco: Never Used     Tobacco comment: e-cig   Substance and Sexual Activity     Alcohol use: Not Currently     Drug use: Not on file     Sexual activity: Yes     Partners: Female   Other Topics Concern     Not on file   Social History Narrative     Not on file     Social Determinants of Health     Financial Resource Strain:      Difficulty of Paying Living Expenses:    Food Insecurity:      Worried About Running Out of Food in the Last Year:      Ran Out of Food in the Last Year:    Transportation Needs:      Lack of Transportation (Medical):      Lack of Transportation (Non-Medical):    Physical Activity:      Days of Exercise per Week:      Minutes of Exercise per Session:    Stress:      Feeling of Stress :    Social Connections:      Frequency of Communication with Friends and Family:      Frequency of Social Gatherings with Friends and Family:      Attends Church Services:      Active Member of Clubs or Organizations:      Attends Club or Organization Meetings:      Marital Status:    Intimate Partner Violence:      Fear of Current or Ex-Partner:      Emotionally Abused:      Physically Abused:      Sexually Abused:        10 point Review of Systems is negative except for left foot pain which is noted in HPI.     /70   Ht 1.778 m (5' 10\")   Wt 102.1 kg (225 lb)   BMI 32.28 kg/m      BMI= Body mass index is " 32.28 kg/m .    OBJECTIVE:  General appearance: Patient is alert and fully cooperative with history & exam.  No sign of distress is noted during the visit.    Vascular: Dorsalis pedis and posterior tibial pulses are palpable. There is pedal hair growth left.  CFT < 3 sec from anterior tibial surface to distal digits left. There is no appreciable edema noted.  Dermatologic: Turgor and texture are within normal limits. No coloration or temperature changes. No primary or secondary lesions noted.  Neurologic: All epicritic and proprioceptive sensations are grossly intact left.  Musculoskeletal: Pain to palpation along the left sinus tarsi. There is mild tenderness along the anterior left ankle joint, no pain with ROM of the ankle. Pain with inversion of the left STJ.     Imaging:     MR Foot Left w/o Contrast    Result Date: 9/15/2021  EXAM: MR FOOT LEFT W/O CONTRAST LOCATION: [facility] DATE/TIME: 9/15/2021 6:42 AM INDICATION: Foot pain, chronic, osteoarthritis suspected. COMPARISON: None. TECHNIQUE: Unenhanced. FINDINGS: JOINTS AND BONES: -No acute left foot fracture. No evidence for stress fracture. Hindfoot valgus. Dorsal spurring talar head. No focal ankle, hindfoot or midfoot chondral defect identified. No significant juxta articular marrow edema. No subchondral cyst formation. TENDONS: -Distal peroneus longus and brevis tendons intact without tendinopathy or tear or lateral tenosynovitis. Superior peroneal retinaculum intact. Posterior tibialis, flexor digitorum longus and flexor hallucis longus tendons intact without medial tenosynovitis. Anterior ankle extensors intact without tenosynovitis. Distal Achilles tendon appears intact where imaged. LIGAMENTS: -Lisfranc ligament: Intact. No subluxation. Intact anterior talofibular ligament, calcaneofibular ligament, posterior talofibular ligament and high ankle ligaments. No deltoid ligament sprain. MUSCLES AND SOFT TISSUES: -Intrinsic muscle  bulk normal. No muscle edema. No space-occupying soft tissue mass or focal fluid collection. Preservation of sinus tarsi fat signal.     IMPRESSION: 1.  No evidence for acute left foot fracture. No stress fracture. 2.  Hindfoot valgus. Dorsal spurring talar head. 3.  No acute left ankle sprain identified. Intact Lisfranc ligament.    XR Foot 3 Views Standing Left    Result Date: 9/8/2021  Exam performed: Left foot Indication: Foot pain Report: The AP and lateral oblique view shows some joint space narrowing of the middle facet of the subtalar joint.  There is also an area of radiolucency within the sinus tarsi of the left foot.  The lateral view of the left foot shows joint space narrowing of the medial facet of the subtalar joint. 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        Sincerely,        Charlie Russell DPM

## 2021-09-28 NOTE — PROGRESS NOTES
FOOT AND ANKLE SURGERY/PODIATRY PROGRESS NOTE        ASSESSMENT: Sinus Tarsi Syndrome left      TREATMENT:  -There is pain to palpation along the left sinus tarsi. There is mild tenderness along the anterior left ankle joint, no pain with ROM of the ankle. Pain with inversion of the left STJ. MRI report is grossly normal, dorsal talar head spurring. I reviewed the patient's x-rays which are negative for STJ DJD.    -Based on the above, I recommend a STJ injection today which appears to be the source of his pain. He consents to a steroid injection.     -Injected 1 cc Kenalog 40/0.5 cc 2% lidocaine plain left sinus tarsi. Patient tolerated the procedure well. Band aid applied.     -CAM boot dispensed today, recommend limited walking. Avoid running.     -Patient's questions invited and answered. Patient to return to clinic in 3 week(s) for re-evaluation. He was encouraged to call my office with any further questions or concerns.     Charlie Russell DPM  [facility] Podiatry/Foot & Ankle Surgery  [phone]      HPI: Froy Hernández was seen today at the request of Dr. Renee for left foot pain. The patient states he has had on-going pain for the past 2-3 years. Admits to a history of recurrent ankle sprains. He did undergo an ankle arthroscopy procedure about two years ago which seemed to help the ankle pain. He now complains of pain more into the dorsal lateral foot. Recent x-rays and MRI of the left foot did not indicate significant pathology.     No past medical history on file.    Past Surgical History:   Procedure Laterality Date     ARTHROSCOPY ANKLE         Allergies   Allergen Reactions     Penicillins Unknown     Other reaction(s): *Unknown         Current Outpatient Medications:      fluocinonide emulsified base 0.05 % external cream, Aoply to affected areas BID until clear, Disp: 60 g, Rfl: 0    No family history on file.    Social History     Socioeconomic History     Marital status:  "     Spouse name: Not on file     Number of children: Not on file     Years of education: Not on file     Highest education level: Not on file   Occupational History     Not on file   Tobacco Use     Smoking status: Former Smoker     Quit date: 2020     Years since quittin.8     Smokeless tobacco: Never Used     Tobacco comment: e-cig   Substance and Sexual Activity     Alcohol use: Not Currently     Drug use: Not on file     Sexual activity: Yes     Partners: Female   Other Topics Concern     Not on file   Social History Narrative     Not on file     Social Determinants of Health     Financial Resource Strain:      Difficulty of Paying Living Expenses:    Food Insecurity:      Worried About Running Out of Food in the Last Year:      Ran Out of Food in the Last Year:    Transportation Needs:      Lack of Transportation (Medical):      Lack of Transportation (Non-Medical):    Physical Activity:      Days of Exercise per Week:      Minutes of Exercise per Session:    Stress:      Feeling of Stress :    Social Connections:      Frequency of Communication with Friends and Family:      Frequency of Social Gatherings with Friends and Family:      Attends [employer] Services:      Active Member of Clubs or Organizations:      Attends Club or Organization Meetings:      Marital Status:    Intimate Partner Violence:      Fear of Current or Ex-Partner:      Emotionally Abused:      Physically Abused:      Sexually Abused:        10 point Review of Systems is negative except for left foot pain which is noted in HPI.     /70   Ht 1.778 m (5' 10\")   Wt 102.1 kg (225 lb)   BMI 32.28 kg/m      BMI= Body mass index is 32.28 kg/m .    OBJECTIVE:  General appearance: Patient is alert and fully cooperative with history & exam.  No sign of distress is noted during the visit.    Vascular: Dorsalis pedis and posterior tibial pulses are palpable. There is pedal hair growth left.  CFT < 3 sec from anterior tibial " surface to distal digits left. There is no appreciable edema noted.  Dermatologic: Turgor and texture are within normal limits. No coloration or temperature changes. No primary or secondary lesions noted.  Neurologic: All epicritic and proprioceptive sensations are grossly intact left.  Musculoskeletal: Pain to palpation along the left sinus tarsi. There is mild tenderness along the anterior left ankle joint, no pain with ROM of the ankle. Pain with inversion of the left STJ.     Imaging:     MR Foot Left w/o Contrast    Result Date: 9/15/2021  EXAM: MR FOOT LEFT W/O CONTRAST LOCATION: [facility] DATE/TIME: 9/15/2021 6:42 AM INDICATION: Foot pain, chronic, osteoarthritis suspected. COMPARISON: None. TECHNIQUE: Unenhanced. FINDINGS: JOINTS AND BONES: -No acute left foot fracture. No evidence for stress fracture. Hindfoot valgus. Dorsal spurring talar head. No focal ankle, hindfoot or midfoot chondral defect identified. No significant juxta articular marrow edema. No subchondral cyst formation. TENDONS: -Distal peroneus longus and brevis tendons intact without tendinopathy or tear or lateral tenosynovitis. Superior peroneal retinaculum intact. Posterior tibialis, flexor digitorum longus and flexor hallucis longus tendons intact without medial tenosynovitis. Anterior ankle extensors intact without tenosynovitis. Distal Achilles tendon appears intact where imaged. LIGAMENTS: -Lisfranc ligament: Intact. No subluxation. Intact anterior talofibular ligament, calcaneofibular ligament, posterior talofibular ligament and high ankle ligaments. No deltoid ligament sprain. MUSCLES AND SOFT TISSUES: -Intrinsic muscle bulk normal. No muscle edema. No space-occupying soft tissue mass or focal fluid collection. Preservation of sinus tarsi fat signal.     IMPRESSION: 1.  No evidence for acute left foot fracture. No stress fracture. 2.  Hindfoot valgus. Dorsal spurring talar head. 3.  No acute left ankle  sprain identified. Intact Lisfranc ligament.    XR Foot 3 Views Standing Left    Result Date: 9/8/2021  Exam performed: Left foot Indication: Foot pain Report: The AP and lateral oblique view shows some joint space narrowing of the middle facet of the subtalar joint.  There is also an area of radiolucency within the sinus tarsi of the left foot.  The lateral view of the left foot shows joint space narrowing of the medial facet of the subtalar joint. 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

## 2021-09-28 NOTE — LETTER
Froy Hernández was seen and treated with an injection of Kenalog in the podiatry clinic 9/28/2021.  He may return to work with the restriction on limited walking on the left foot with the use of a CAM boot. These restrictions will be reviewed at the follow up appointment on 10/19/2021.  If you have any questions or concerns, please don't hesitate to call.      Charlie Russell DPM

## 2021-10-19 ENCOUNTER — OFFICE VISIT (OUTPATIENT)
Dept: PODIATRY | Facility: CLINIC | Age: 26
End: 2021-10-19
Payer: OTHER GOVERNMENT

## 2021-10-19 VITALS — TEMPERATURE: 98.5 F

## 2021-10-19 DIAGNOSIS — M77.52 CAPSULITIS OF LEFT ANKLE: ICD-10-CM

## 2021-10-19 DIAGNOSIS — M25.572 SINUS TARSI SYNDROME, LEFT: Primary | ICD-10-CM

## 2021-10-19 PROCEDURE — 20605 DRAIN/INJ JOINT/BURSA W/O US: CPT | Mod: LT | Performed by: PODIATRIST

## 2021-10-19 RX ORDER — LIDOCAINE HYDROCHLORIDE 20 MG/ML
40 INJECTION, SOLUTION INFILTRATION; PERINEURAL ONCE
Status: COMPLETED | OUTPATIENT
Start: 2021-10-19 | End: 2021-10-19

## 2021-10-19 RX ORDER — TRIAMCINOLONE ACETONIDE 40 MG/ML
40 INJECTION, SUSPENSION INTRA-ARTICULAR; INTRAMUSCULAR ONCE
Status: COMPLETED | OUTPATIENT
Start: 2021-10-19 | End: 2021-10-19

## 2021-10-19 RX ADMIN — TRIAMCINOLONE ACETONIDE 40 MG: 40 INJECTION, SUSPENSION INTRA-ARTICULAR; INTRAMUSCULAR at 11:50

## 2021-10-19 RX ADMIN — LIDOCAINE HYDROCHLORIDE 40 MG: 20 INJECTION, SOLUTION INFILTRATION; PERINEURAL at 11:50

## 2021-10-19 NOTE — Clinical Note
10/19/2021         RE: Froy Hernández  [address] [address]  [address] [place] [address]        Dear Colleague,    Thank you for referring your patient, Froy Hernández, to the [facility]. Please see a copy of my visit note below.        FOOT AND ANKLE SURGERY/PODIATRY PROGRESS NOTE        ASSESSMENT:   Sinus Tarsi Syndrome left  Capsulitis left ankle      TREATMENT:  -Patient's symptoms have significantly improved since his last visit with a steroid injection. There is moderate pain along the anterior left ankle joint and mild pain along the left sinus tarsi.     -He appears to have more pain along the left ankle joint on exam today. We discussed and he has consented to a steroid injection at this location today. Injected 1 cc Kenalog 40/0.5 cc 2% lidocaine plain ***. Patient tolerated the procedure well. Band aid applied.     -Patient's questions invited and answered. Patient to return to clinic in *** week(s) for re-evaluation. *** was encouraged to call my office with any further questions or concerns.     Charlie Russell DPM  [facility] Podiatry/Foot & Ankle Surgery  [phone]      HPI: Froy Hernández was seen again today for a painful left ankle. The patient states he now only notices pain about once per week while using the CAM boot.     No past medical history on file.    Past Surgical History:   Procedure Laterality Date     ARTHROSCOPY ANKLE         Allergies   Allergen Reactions     Penicillins Unknown     Other reaction(s): *Unknown         Current Outpatient Medications:      fluocinonide emulsified base 0.05 % external cream, Aoply to affected areas BID until clear, Disp: 60 g, Rfl: 0    No family history on file.    Social History     Socioeconomic History     Marital status:      Spouse name: Not on file     Number of children: Not on file     Years of education: Not on file     Highest education level: Not on file    Occupational History     Not on file   Tobacco Use     Smoking status: Former Smoker     Quit date: 2020     Years since quittin.8     Smokeless tobacco: Never Used     Tobacco comment: e-cig   Substance and Sexual Activity     Alcohol use: Not Currently     Drug use: Not on file     Sexual activity: Yes     Partners: Female   Other Topics Concern     Not on file   Social History Narrative     Not on file     Social Determinants of Health     Financial Resource Strain:      Difficulty of Paying Living Expenses:    Food Insecurity:      Worried About Running Out of Food in the Last Year:      Ran Out of Food in the Last Year:    Transportation Needs:      Lack of Transportation (Medical):      Lack of Transportation (Non-Medical):    Physical Activity:      Days of Exercise per Week:      Minutes of Exercise per Session:    Stress:      Feeling of Stress :    Social Connections:      Frequency of Communication with Friends and Family:      Frequency of Social Gatherings with Friends and Family:      Attends [employer] Services:      Active Member of Clubs or Organizations:      Attends Club or Organization Meetings:      Marital Status:    Intimate Partner Violence:      Fear of Current or Ex-Partner:      Emotionally Abused:      Physically Abused:      Sexually Abused:        10 point Review of Systems is negative except for left foot and ankle pain which is noted in HPI.     Temp 98.5  F (36.9  C)     BMI= There is no height or weight on file to calculate BMI.    OBJECTIVE:  General appearance: Patient is alert and fully cooperative with history & exam.  No sign of distress is noted during the visit.    Vascular: Dorsalis pedis and posterior tibial pulses are palpable. There is pedal hair growth left.  CFT < 3 sec from anterior tibial surface to distal digits left. There is no appreciable edema noted.  Dermatologic: Turgor and texture are within normal limits. No coloration or temperature changes. No  primary or secondary lesions noted.  Neurologic: All epicritic and proprioceptive sensations are grossly intact left.  Musculoskeletal: Mild pain sinus tarsi left and moderate pain anterior left ankle joint. Full ROM STJ and ankle bilateral.         Again, thank you for allowing me to participate in the care of your patient.        Sincerely,        Charlie Russell DPM

## 2021-10-19 NOTE — LETTER
Froy Hernández was seen and treated with an injection of Kenalog in the podiatry clinic 10/19/2021.  He may return to work with the restriction on limited walking on the left foot with the use of a CAM boot no running. These restrictions will be reviewed at the follow up appointment on 11/9/2021.  If you have any questions or concerns, please don't hesitate to call.      Charlie Russell DPM

## 2021-10-19 NOTE — LETTER
Froy Hernández was seen and treated with an injection of Kenalog in the podiatry clinic 10/19/2021.  He may return to work with the restriction on limited walking on the left foot with the use of a CAM boot. These restrictions will be reviewed at the follow up appointment on 11/9/2021.  If you have any questions or concerns, please don't hesitate to call.      Charlie Russell DPM

## 2021-10-19 NOTE — PATIENT INSTRUCTIONS
Limited walking with the CAM boot on the left foot      Today in clinic you received an injection of Kenalog mixed with lidocaine. As the lidocaine wears off you may experience more pain. This pain may last up to several days as the injection sets up. It can take up to a week before you feel the full benefit of your injection.     Please call our office if you begin to see signs of an infection. Signs or symptoms of an infection may include: fever, redness, warmth, or swelling at the injection sight, and discolored drainage. [phone].

## 2021-10-19 NOTE — PROGRESS NOTES
FOOT AND ANKLE SURGERY/PODIATRY PROGRESS NOTE        ASSESSMENT:   Sinus Tarsi Syndrome left  Capsulitis left ankle      TREATMENT:  -Patient's symptoms have significantly improved since his last visit with a steroid injection. There is moderate pain along the anterior left ankle joint and mild pain along the left sinus tarsi.     -He appears to have more pain along the left ankle joint on exam today. We discussed and he has consented to a steroid injection at this location today. Injected 1 cc Kenalog 40/0.5 cc 2% lidocaine plain left ankle. Patient tolerated the procedure well. Band aid applied.     -Patient's questions invited and answered. Patient to return to clinic in 3 week(s) for re-evaluation. He was encouraged to call my office with any further questions or concerns.     Charlie Russell DPM  [facility] Podiatry/Foot & Ankle Surgery  [phone]      HPI: Froy Hernández was seen again today for a painful left ankle. The patient states he now only notices pain about once per week while using the CAM boot.     No past medical history on file.    Past Surgical History:   Procedure Laterality Date     ARTHROSCOPY ANKLE         Allergies   Allergen Reactions     Penicillins Unknown     Other reaction(s): *Unknown         Current Outpatient Medications:      fluocinonide emulsified base 0.05 % external cream, Aoply to affected areas BID until clear, Disp: 60 g, Rfl: 0    No family history on file.    Social History     Socioeconomic History     Marital status:      Spouse name: Not on file     Number of children: Not on file     Years of education: Not on file     Highest education level: Not on file   Occupational History     Not on file   Tobacco Use     Smoking status: Former Smoker     Quit date: 2020     Years since quittin.8     Smokeless tobacco: Never Used     Tobacco comment: e-cig   Substance and Sexual Activity     Alcohol use: Not Currently     Drug use: Not on  file     Sexual activity: Yes     Partners: Female   Other Topics Concern     Not on file   Social History Narrative     Not on file     Social Determinants of Health     Financial Resource Strain:      Difficulty of Paying Living Expenses:    Food Insecurity:      Worried About Running Out of Food in the Last Year:      Ran Out of Food in the Last Year:    Transportation Needs:      Lack of Transportation (Medical):      Lack of Transportation (Non-Medical):    Physical Activity:      Days of Exercise per Week:      Minutes of Exercise per Session:    Stress:      Feeling of Stress :    Social Connections:      Frequency of Communication with Friends and Family:      Frequency of Social Gatherings with Friends and Family:      Attends [employer] Services:      Active Member of Clubs or Organizations:      Attends Club or Organization Meetings:      Marital Status:    Intimate Partner Violence:      Fear of Current or Ex-Partner:      Emotionally Abused:      Physically Abused:      Sexually Abused:        10 point Review of Systems is negative except for left foot and ankle pain which is noted in HPI.     Temp 98.5  F (36.9  C)     BMI= There is no height or weight on file to calculate BMI.    OBJECTIVE:  General appearance: Patient is alert and fully cooperative with history & exam.  No sign of distress is noted during the visit.    Vascular: Dorsalis pedis and posterior tibial pulses are palpable. There is pedal hair growth left.  CFT < 3 sec from anterior tibial surface to distal digits left. There is no appreciable edema noted.  Dermatologic: Turgor and texture are within normal limits. No coloration or temperature changes. No primary or secondary lesions noted.  Neurologic: All epicritic and proprioceptive sensations are grossly intact left.  Musculoskeletal: Mild pain sinus tarsi left and moderate pain anterior left ankle joint. Full ROM STJ and ankle bilateral.

## 2021-11-08 ENCOUNTER — TELEPHONE (OUTPATIENT)
Dept: FAMILY MEDICINE | Facility: CLINIC | Age: 26
End: 2021-11-08
Payer: OTHER GOVERNMENT

## 2021-11-08 NOTE — TELEPHONE ENCOUNTER
Reason for Call:  Other appointment    Detailed comments: sore throat    Phone Number Patient can be reached at: Home number on file [phone] (home)    Best Time: any    Can we leave a detailed message on this number? YES    Call taken on 11/8/2021 at 7:19 AM by Gloria Mcneil

## 2021-11-08 NOTE — TELEPHONE ENCOUNTER
Called patient to advise being seen @ [facility] or Urgent Care. He states he already did that earlier today.     Ambar Omer, ARMENA

## 2021-11-09 ENCOUNTER — OFFICE VISIT (OUTPATIENT)
Dept: PODIATRY | Facility: CLINIC | Age: 26
End: 2021-11-09
Payer: OTHER GOVERNMENT

## 2021-11-09 VITALS — SYSTOLIC BLOOD PRESSURE: 106 MMHG | TEMPERATURE: 97.9 F | DIASTOLIC BLOOD PRESSURE: 70 MMHG

## 2021-11-09 DIAGNOSIS — M25.572 SINUS TARSI SYNDROME, LEFT: Primary | ICD-10-CM

## 2021-11-09 DIAGNOSIS — M77.52 CAPSULITIS OF LEFT ANKLE: ICD-10-CM

## 2021-11-09 PROCEDURE — 99213 OFFICE O/P EST LOW 20 MIN: CPT | Performed by: PODIATRIST

## 2021-11-09 NOTE — PATIENT INSTRUCTIONS
A referral was sent to [facility]. You will receive a phone call in 1-2 business days to schedule you appointment. If for some reason you do not hear from them or wish to schedule sooner please call [phone] to schedule.    [facility] - [address], [address]  [place] [id number]      Continue to were your CAM boot until you start working with physical therapy then follow there instruction on if the boot is needed

## 2021-11-09 NOTE — PROGRESS NOTES
FOOT AND ANKLE SURGERY/PODIATRY PROGRESS NOTE        ASSESSMENT:   Sinus Tarsi Syndrome left  Capsulitis left ankle      TREATMENT:  -Patient's symptoms have significantly improved since his last visit with a steroid injection. No pain on exam today along the STJ and ankle.     -I recommend and have referred him to physical therapy for strength and ROM training. I recommend he continue to use the CAM boot until evaluated by PT.     -Patient's questions invited and answered. He is discharged from my care at this time but encouraged to call my office with any further questions or concerns.     Charlie Russell DPM  [facility] Podiat[facility]/Foot & Ankle Surgery  [phone]      HPI: Froy Hernández was seen again today for a painful left ankle. The patient denies pain walking in the CAM boot since the previous steroid injection into the left ankle.     No past medical history on file.    Past Surgical History:   Procedure Laterality Date     ARTHROSCOPY ANKLE         Allergies   Allergen Reactions     Penicillins Unknown     Other reaction(s): *Unknown         Current Outpatient Medications:      fluocinonide emulsified base 0.05 % external cream, Aoply to affected areas BID until clear, Disp: 60 g, Rfl: 0    No family history on file.    Social History     Socioeconomic History     Marital status:      Spouse name: Not on file     Number of children: Not on file     Years of education: Not on file     Highest education level: Not on file   Occupational History     Not on file   Tobacco Use     Smoking status: Former Smoker     Quit date: 2020     Years since quittin.9     Smokeless tobacco: Never Used     Tobacco comment: e-cig   Substance and Sexual Activity     Alcohol use: Not Currently     Drug use: Not on file     Sexual activity: Yes     Partners: Female   Other Topics Concern     Not on file   Social History Narrative     Not on file     Social Determinants of Health      Financial Resource Strain: Not on file   Food Insecurity: Not on file   Transportation Needs: Not on file   Physical Activity: Not on file   Stress: Not on file   Social Connections: Not on file   Intimate Partner Violence: Not on file   Housing Stability: Not on file       10 point Review of Systems is negative except for left foot and ankle pain which is noted in HPI.     /70   Temp 97.9  F (36.6  C)     BMI= There is no height or weight on file to calculate BMI.    OBJECTIVE:  General appearance: Patient is alert and fully cooperative with history & exam.  No sign of distress is noted during the visit.    Vascular: Dorsalis pedis and posterior tibial pulses are palpable. There is pedal hair growth left.  CFT < 3 sec from anterior tibial surface to distal digits left. There is no appreciable edema noted.  Dermatologic: Turgor and texture are within normal limits. No coloration or temperature changes. No primary or secondary lesions noted.  Neurologic: All epicritic and proprioceptive sensations are grossly intact left.  Musculoskeletal: No pain sinus tarsi left or anterior left ankle joint. Full ROM STJ and ankle bilateral.

## 2021-11-09 NOTE — LETTER
Froy Hernández was seen and treated  in the podiatry clinic 11/9/2021.  He may return to work with the restriction on limited walking on the left foot with the use of a CAM boot. These restrictions will continue until patient meets with physical therapy and they will take over restriction instructions.    If you have any questions or concerns, please don't hesitate to call.      Charlie Russell DPM

## 2021-11-09 NOTE — LETTER
2021         RE: Froy Hernández  [address]  [address] [place] [id number]        Dear Colleague,    Thank you for referring your patient, Froy Hernández, to the [facility]. Please see a copy of my visit note below.        FOOT AND ANKLE SURGERY/PODIATRY PROGRESS NOTE        ASSESSMENT:   Sinus Tarsi Syndrome left  Capsulitis left ankle      TREATMENT:  -Patient's symptoms have significantly improved since his last visit with a steroid injection. No pain on exam today along the STJ and ankle.     -I recommend and have referred him to physical therapy for strength and ROM training. I recommend he continue to use the CAM boot until evaluated by PT.     -Patient's questions invited and answered. He is discharged from my care at this time but encouraged to call my office with any further questions or concerns.     Charlie Russell DPM  [facility] Podiatry/Foot & Ankle Surgery  [phone]      HPI: Froy Hernández was seen again today for a painful left ankle. The patient denies pain walking in the CAM boot since the previous steroid injection into the left ankle.     No past medical history on file.    Past Surgical History:   Procedure Laterality Date     ARTHROSCOPY ANKLE         Allergies   Allergen Reactions     Penicillins Unknown     Other reaction(s): *Unknown         Current Outpatient Medications:      fluocinonide emulsified base 0.05 % external cream, Aoply to affected areas BID until clear, Disp: 60 g, Rfl: 0    No family history on file.    Social History     Socioeconomic History     Marital status:      Spouse name: Not on file     Number of children: Not on file     Years of education: Not on file     Highest education level: Not on file   Occupational History     Not on file   Tobacco Use     Smoking status: Former Smoker     Quit date: 2020     Years since quittin.9     Smokeless tobacco: Never Used      Tobacco comment: e-cig   Substance and Sexual Activity     Alcohol use: Not Currently     Drug use: Not on file     Sexual activity: Yes     Partners: Female   Other Topics Concern     Not on file   Social History Narrative     Not on file     Social Determinants of Health     Financial Resource Strain: Not on file   Food Insecurity: Not on file   Transportation Needs: Not on file   Physical Activity: Not on file   Stress: Not on file   Social Connections: Not on file   Intimate Partner Violence: Not on file   Housing Stability: Not on file       10 point Review of Systems is negative except for left foot and ankle pain which is noted in HPI.     /70   Temp 97.9  F (36.6  C)     BMI= There is no height or weight on file to calculate BMI.    OBJECTIVE:  General appearance: Patient is alert and fully cooperative with history & exam.  No sign of distress is noted during the visit.    Vascular: Dorsalis pedis and posterior tibial pulses are palpable. There is pedal hair growth left.  CFT < 3 sec from anterior tibial surface to distal digits left. There is no appreciable edema noted.  Dermatologic: Turgor and texture are within normal limits. No coloration or temperature changes. No primary or secondary lesions noted.  Neurologic: All epicritic and proprioceptive sensations are grossly intact left.  Musculoskeletal: No pain sinus tarsi left or anterior left ankle joint. Full ROM STJ and ankle bilateral.         Again, thank you for allowing me to participate in the care of your patient.        Sincerely,        Charlie Russell DPM

## 2021-12-02 ENCOUNTER — HOSPITAL ENCOUNTER (OUTPATIENT)
Dept: PHYSICAL THERAPY | Facility: REHABILITATION | Age: 26
End: 2021-12-02
Attending: PODIATRIST
Payer: OTHER GOVERNMENT

## 2021-12-02 ENCOUNTER — OFFICE VISIT (OUTPATIENT)
Dept: FAMILY MEDICINE | Facility: CLINIC | Age: 26
End: 2021-12-02
Payer: OTHER GOVERNMENT

## 2021-12-02 VITALS
HEART RATE: 74 BPM | BODY MASS INDEX: 33 KG/M2 | SYSTOLIC BLOOD PRESSURE: 110 MMHG | WEIGHT: 230 LBS | OXYGEN SATURATION: 97 % | DIASTOLIC BLOOD PRESSURE: 72 MMHG

## 2021-12-02 DIAGNOSIS — R29.898 ANKLE WEAKNESS: Primary | ICD-10-CM

## 2021-12-02 DIAGNOSIS — M25.572 SINUS TARSI SYNDROME, LEFT: ICD-10-CM

## 2021-12-02 DIAGNOSIS — R26.89 BALANCE PROBLEMS: ICD-10-CM

## 2021-12-02 DIAGNOSIS — M77.52 CAPSULITIS OF LEFT ANKLE: ICD-10-CM

## 2021-12-02 DIAGNOSIS — L30.9 ECZEMA, UNSPECIFIED TYPE: Primary | ICD-10-CM

## 2021-12-02 PROCEDURE — 97112 NEUROMUSCULAR REEDUCATION: CPT | Mod: GP | Performed by: PHYSICAL THERAPIST

## 2021-12-02 PROCEDURE — 97110 THERAPEUTIC EXERCISES: CPT | Mod: GP | Performed by: PHYSICAL THERAPIST

## 2021-12-02 PROCEDURE — 97161 PT EVAL LOW COMPLEX 20 MIN: CPT | Mod: GP | Performed by: PHYSICAL THERAPIST

## 2021-12-02 PROCEDURE — 99213 OFFICE O/P EST LOW 20 MIN: CPT | Performed by: FAMILY MEDICINE

## 2021-12-02 NOTE — DISCHARGE INSTRUCTIONS
[place] with wearing neoprene ankle sleeve - if you wear it for 1 week in a row during more physical activities - see if you feel like your pain intensity or frequency of pain improves and if swelling issues improve    Exercises      Put band around base of toes and pull up against resistance x10-20 reps 1-2 sets 1-2 x/day - can try slow lower!      Band base of toes  - try to pull band out to the side with heel staying on the ground x10-20 reps 1-2 sets 1-2x/day    Balance exercises      Work up to 60 seconds x5 reps 1-2x/day - when easy can try to change to eyes closed  And on a pillow separately    Stretch    x10-20 seconds hold x3-5 reps 1-2x/day

## 2021-12-06 NOTE — PROGRESS NOTES
"  Assessment & Plan     Eczema, unspecified type    Since this cream seems to help the rash to go away but it is not completely making it resolve, all symptoms are dermatology rather than just having him continue to use the ointment.  Hopefully we can get a bit of a definitive answer on what this is if there is a better way to manage this.      - Adult Dermatology Referral; Future    Prescription drug management         BMI:   Estimated body mass index is 33 kg/m  as calculated from the following:    Height as of 9/28/21: 1.778 m (5' 10\").    Weight as of this encounter: 104.3 kg (230 lb).           No follow-ups on file.    Bobby Myers MD  [facility] BERNY Mcduffie is a 26 year old who presents for the following health issues     HPI     Patient here today with a persistence of the rash that we saw him for a bit ago on his right elbow area and onto his right wrist.  He has this red pruritic rash that responds well to the fluocinolone cream that I gave him.  However it comes back right away when he stops using it and is really frustrating because it is very pruritic and bothersome to him.Constitutional, HEENT, cardiovascular, pulmonary, gi and gu systems are negative, except as otherwise noted.      Review of Systems   Constitutional, HEENT, cardiovascular, pulmonary, gi and gu systems are negative, except as otherwise noted.      Objective    /72 (BP Location: Left arm, Patient Position: Sitting, Cuff Size: Adult Large)   Pulse 74   Wt 104.3 kg (230 lb)   SpO2 97%   BMI 33.00 kg/m    Body mass index is 33 kg/m .  Physical Exam   Again eczematous rash seen in the antecubital area and also up onto the right wrist.  There are some excoriations present.                "

## 2021-12-08 NOTE — ADDENDUM NOTE
Encounter addended by: Toyin Felix PT on: 12/8/2021 10:42 AM   Actions taken: Clinical Note Signed, Flowsheet accepted

## 2021-12-08 NOTE — PROGRESS NOTES
12/02/21 1100   General Information   Type of Visit Initial OP Ortho PT Evaluation   Start of Care Date 12/02/21   Referring Physician Charlie Russell DPM   Orders Evaluate and Treat   Orders Comment 7 visits until May 2022    Date of Order 11/09/21   Certification Required? No   Medical Diagnosis Sinus tarsi syndrome, left M25.572    Surgical/Medical history reviewed Yes   Precautions/Limitations no known precautions/limitations   General Information Comments Pt reports h/o L ACL tear with reconstruction Freshman year of highschool and R ACL tear around 2017 with reconstruction - doing well.    Body Part(s)   Body Part(s) Ankle/Foot   Presentation and Etiology   Pertinent history of current problem (include personal factors and/or comorbidities that impact the POC) Pt reports he has rolled his ankle a lot. Pt thinks his last serious ankle sprain with swelling and bruising was around 2016 but pt remembers having issues with left ankle sprains since he was a teen. Pt had a surgery January 2020 in [place] where they removed some impingement issue. Pt was supposed to have another surgery to try to tighten up his ankle jt but then that was canceled. Pt then returned back to [place] and had been trying to continue with being active with running, basketball and football but the pain has continued to bother. Pain can bother sometimes while he is playing and then more often it bothers afterwards, pain with sleeping making it hard to fall asleep and pain with just daily activities. Pt reports he will need to perform dead lift, push ups, pull ups, sprint/drag/carry drill, and 2 mile run for PT testing. Pt reports recently getting an injection in the talocrural and subtalar jts and that has felt better.    Impairments A. Pain;F. Decreased strength and endurance;G. Impaired balance   Functional Limitations perform activities of daily living;perform required work activities;perform desired leisure / sports activities    Symptom Location L ankle   Chronicity Chronic   Pain rating (0-10 point scale) Other   Pain rating comment 2-8/10   Pain quality A. Sharp;B. Dull   Frequency of pain/symptoms C. With activity   Progression of symptoms since onset: Worsened   Current / Previous Interventions   Diagnostic Tests: X-ray;MRI   X-ray Results Results   X-ray results L DJD subtalar jt   MRI Results Results   MRI results Dorsal spurring talar head.   Fall Risk Screen   Fall screen completed by PT   Have you fallen 2 or more times in the past year? No   Have you fallen and had an injury in the past year? No   Is patient a fall risk? No   Abuse Screen (yes response referral indicated)   Feels Unsafe at Home or Work/School no   Feels Threatened by Someone no   Does Anyone Try to Keep You From Having Contact with Others or Doing Things Outside Your Home? no   Physical Signs of Abuse Present no   Functional Scales   Functional Scales Other   Other Scales  LEFS 73/80   Ankle/Foot Objective Findings   Side (if bilateral, select both right and left) Left   Observation Lumbar spine WNL without increase in left foot/ankle pain   Ankle/Foot ROM Comment R ankle DF 10 degrees, PF 60 degrees, inver 30 degrees and Ever 25 degrees   Ankle/Foot Special Tests Comments SLS R LE 30 seconds with min balance checks, L LE 30 seconds with major balance checks with hip strategy    Left DF (Knee Ext) AROM 5 degrees with anterior ankle    Left PF AROM 60 degrees without pain   Left Calcanceal Inversion AROM 42 degrees with lateral anterior pain   Left Calcaneal Eversion AROM 15 degrees with lateral ankle pain   Left DF/Inversion Strength 5/5   Left DF/Eversion Strength 4+/5 with pain   Left PF/Inversion Strength 5/5   Left PF/Eversion Strength 4+/5 with pain   Left PF Strength 5/5   Left Gastroc (in WB) Flexibility Tight   Left Soleus (in WB) Flexibility Tight with increased ankle pain   Planned Therapy Interventions   Planned Therapy Interventions balance  training;motor coordination training;neuromuscular re-education;ROM;strengthening;stretching   Clinical Impression   Criteria for Skilled Therapeutic Interventions Met yes, treatment indicated   PT Diagnosis L ankle pain with decreased ROM, strength and balance   Clinical Presentation Stable/Uncomplicated   Clinical Decision Making (Complexity) Low complexity   Therapy Frequency other (see comments)  (1x/every 2-4 weeks)   Predicted Duration of Therapy Intervention (days/wks) 12 weeks   Risk & Benefits of therapy have been explained Yes   Patient, Family & other staff in agreement with plan of care Yes   Clinical Impression Comments Pt demo's signs and sx consistent with chronic L ankle pain with recurrent sprains with ROM imbalances, decreased strength and balance causing difficulty with functional mobility, exercising, playing sports and performance for active duty . Pt is good candidate for skilled PT services to decrease impairments and reach goals.   ORTHO GOALS   PT Ortho Eval Goals 1;2;3   Ortho Goal 1   Goal Description Pt will be able to walk without increase in left ankle pain for improved QOL in 12 weeks.    Target Date 02/23/22   Ortho Goal 2   Goal Description Pt will be able to perform L SLS for 30-60 seconds with minimal balance check and without increase in ankle pain for improved stability L ankle jt for functional mobility in 12 weeks.    Target Date 02/23/22   Ortho Goal 3   Goal Description Pt will be able to perform PT testing for activity duty, run and play sports without increase in L ankle pain for improved QOL in 12 weeks.   Target Date 02/23/22   Total Evaluation Time   PT Eval, Low Complexity Minutes ([id number]) 30

## 2022-01-13 ENCOUNTER — TRANSFERRED RECORDS (OUTPATIENT)
Dept: HEALTH INFORMATION MANAGEMENT | Facility: CLINIC | Age: 27
End: 2022-01-13
Payer: OTHER GOVERNMENT

## 2022-04-13 ENCOUNTER — OFFICE VISIT (OUTPATIENT)
Dept: INTERNAL MEDICINE | Facility: CLINIC | Age: 27
End: 2022-04-13
Payer: OTHER GOVERNMENT

## 2022-04-13 VITALS
OXYGEN SATURATION: 96 % | WEIGHT: 225 LBS | HEIGHT: 70 IN | HEART RATE: 81 BPM | SYSTOLIC BLOOD PRESSURE: 110 MMHG | BODY MASS INDEX: 32.21 KG/M2 | DIASTOLIC BLOOD PRESSURE: 72 MMHG

## 2022-04-13 DIAGNOSIS — M51.26 PROTRUDED LUMBAR DISC: Primary | ICD-10-CM

## 2022-04-13 DIAGNOSIS — M51.16 INTERVERTEBRAL DISC DISORDERS WITH RADICULOPATHY, LUMBAR REGION: ICD-10-CM

## 2022-04-13 PROCEDURE — 99203 OFFICE O/P NEW LOW 30 MIN: CPT | Performed by: INTERNAL MEDICINE

## 2022-04-13 RX ORDER — CYCLOBENZAPRINE HCL 10 MG
10 TABLET ORAL 3 TIMES DAILY PRN
Qty: 30 TABLET | Refills: 0 | Status: SHIPPED | OUTPATIENT
Start: 2022-04-13 | End: 2023-01-25

## 2022-04-13 RX ORDER — MELOXICAM 15 MG/1
15 TABLET ORAL DAILY
Qty: 30 TABLET | Refills: 0 | Status: SHIPPED | OUTPATIENT
Start: 2022-04-13 | End: 2023-01-25

## 2022-04-13 RX ORDER — METHYLPREDNISOLONE 4 MG
TABLET, DOSE PACK ORAL
Qty: 21 TABLET | Refills: 0 | Status: SHIPPED | OUTPATIENT
Start: 2022-04-13 | End: 2023-01-25

## 2022-04-13 NOTE — PROGRESS NOTES
"(M51.26) Protruded lumbar disc  (primary encounter diagnosis)    Plan: Spine Referral, meloxicam (MOBIC) 15 MG tablet,        cyclobenzaprine (FLEXERIL) 10 MG tablet,         methylPREDNISolone (MEDROL DOSEPAK) 4 MG tablet        therapy pack, diclofenac (VOLTAREN) 1 % topical        gel            (M51.16) Intervertebral disc disorders with radiculopathy, lumbar region    Plan: Spine Referral, meloxicam (MOBIC) 15 MG tablet,        cyclobenzaprine (FLEXERIL) 10 MG tablet,         methylPREDNISolone (MEDROL DOSEPAK) 4 MG tablet        therapy pack, diclofenac (VOLTAREN) 1 % topical        gel       27 yo male is presenting with acute on chronic low back pain, present for about a year. In April 2021, a year ago, was seen in the ER with similar symptoms, treated with medrolpak, percocet. CT showed:1.  Small, broad-based left paracentral L5-S1 disc protrusion with slight left S1 nerve contact. 2.  Mild L4-L5 disc bulge.  He was referred to the spine clinic at that time, but was not able to get the appointment.  In the last few months, noticed more frequent bilateral low back pain, L>R, worse with prolonged sitting. Sleeping well, no pain. [place] [facility] [place] [facility] noticed \"funny\"feeling like weakness in the left leg, which resolved after having some rest. Ibuprofen and OTC topical treatment gave him some minimal relief.  He denies injury, heavy lifting, incontinence, paresthesia, foot drop, and persistent weakness in the legs.  No fever, chills.    No ref flag symptoms or findings on the exam today.  Rx sent for muscle relaxant, medrolpak, meloxicam, and Voltaren gel. He will see Spine clinic and they will determine if needs another images like MRI.    PT discussed, will wait to see spine clinic.    Indications for emergency department discussed with patient, who verbalized good understanding and agreement.       This note has been dictated using voice recognition software. Any grammatical or context " "distortions are unintentional and inherent to the software.      Return in about 2 weeks (around 2022) for Follow up.    There are no Patient Instructions on file for this visit.        Subjective:    Froy Hernández is a 26 year old male  here for follow up.      Social History     Socioeconomic History     Marital status:      Spouse name: Not on file     Number of children: Not on file     Years of education: Not on file     Highest education level: Not on file   Occupational History     Not on file   Tobacco Use     Smoking status: Former Smoker     Quit date: 2020     Years since quittin.3     Smokeless tobacco: Never Used     Tobacco comment: e-cig   Substance and Sexual Activity     Alcohol use: Not Currently     Drug use: Not on file     Sexual activity: Yes     Partners: Female   Other Topics Concern     Not on file   Social History Narrative     Not on file     Social Determinants of Health     Financial Resource Strain: Not on file   Food Insecurity: Not on file   Transportation Needs: Not on file   Physical Activity: Not on file   Stress: Not on file   Social Connections: Not on file   Intimate Partner Violence: Not on file   Housing Stability: Not on file       No family history on file.  Review of Systems:  A 12 point comprehensive review of systems was negative except as noted in HPI.        Objective:    Physical Exam   /72 (BP Location: Right arm, Patient Position: Sitting)   Pulse 81   Ht 1.778 m (5' 10\")   Wt 102.1 kg (225 lb)   SpO2 96%   BMI 32.28 kg/m    Body mass index is 32.28 kg/m .    Constitutional: oriented to person, place, and time, appears well-nourished. No distress.   HENT:   Head: Normocephalic.   Eyes: Conjunctivae are normal.   Neck: Normal range of motion.   Pulmonary/Chest: Effort normal   Abdominal: Soft.   Musculoskeletal: Normal range of motion in the lower back with muscle spasm in paravertebral muscles bilat. straight leg test " negative, DTRs normal, gait normal. Toe and heel walking normal. No LE weakness.  Neurological: alert and oriented to person, place, and time.  Psychiatric:  normal mood and affect.      Patient Active Problem List   Diagnosis     Visual impairment     Viral warts     Tear of meniscus of knee     Sprain of anterior cruciate ligament of right knee     Other instability, left ankle     Nonspecific reaction to tuberculin test     Pain in left ankle and joints of left foot     Drug resistant tuberculosis     Closed fracture of middle or proximal phalanx or phalanges of hand     Chronic post-traumatic headache     Chronic allergic conjunctivitis     Allergic rhinitis     Sinus tarsi syndrome, left     Capsulitis of left ankle       Current Outpatient Medications   Medication     cyclobenzaprine (FLEXERIL) 10 MG tablet     diclofenac (VOLTAREN) 1 % topical gel     fluocinonide emulsified base 0.05 % external cream     meloxicam (MOBIC) 15 MG tablet     methylPREDNISolone (MEDROL DOSEPAK) 4 MG tablet therapy pack     No current facility-administered medications for this visit.               Corinne Manuel MD  4/13/2022  Answers for HPI/ROS submitted by the patient on 4/13/2022  Your back pain is: recurring  Where is your back pain located? : right middle of back  How would you describe your back pain? : sharp  Where does your back pain spread? : nowhere  Since you noticed your back pain, how has it changed? : always present, but gets better and worse  Does your back pain interfere with your job?: No  How many servings of fruits and vegetables do you eat daily?: 0-1  On average, how many sweetened beverages do you drink each day (Examples: soda, juice, sweet tea, etc.  Do NOT count diet or artificially sweetened beverages)?: 3  How many minutes a day do you exercise enough to make your heart beat faster?: 30 to 60  How many days a week do you exercise enough to make your heart beat faster?: 4  How many days per week do you  miss taking your medication?: 0

## 2022-04-15 NOTE — PROGRESS NOTES
ASSESSMENT: Froy Hernández is a 26 year old male who is otherwise healthy who presents today for new patient evaluation of acute on chronic (right low back pain with intermittent radiation into the left lower extremity with intermittent subjective left lower extremity weakness.  Pain has been present for 1 year but flared up 2 to 3 weeks ago.  My review of a CT lumbar spine from April 2021 showed a small left L5-S1 disc protrusion minimally contacting the left S1 nerve root in the lateral recess.  There was also a mild disc bulge at L4-5 but no neural compromise.  I am concerned he has had progression of the disc protrusion.  He is neurologically intact on exam today.    PLAN:  A shared decision making model was used.  The patient's values and choices were respected.  The following represents what was discussed and decided upon by the physician assistant and the patient.      1.  DIAGNOSTIC TESTS: I reviewed the CT lumbar spine from April 2021.  I did an MRI lumbar spine for further evaluation.  Symptoms have been present for more than 1 year.  He is now experiencing intermittent weakness.    2.  PHYSICAL THERAPY:  Discussed the importance of core strengthening, ROM, stretching exercises with the patient and how each of these entities is important in decreasing pain.  Explained to the patient that the purpose of physical therapy is to teach the patient a home exercise program.  These exercises need to be performed every day in order to decrease pain and prevent future occurrences of pain.  I entered a referral to physical therapy.  I would like him to do the [facility] program.    3.  MEDICATIONS:    -Gabapentin 300 mg was prescribed.  He was given the dosage titration chart.  He may increase his dose up to 300 mg 3 times daily.  We can potentially titrate his dose higher, depending on his response.  - Patient completed a Medrol Dosepak today.  He is not sure if it helps.  - Patient tried meloxicam 15 mg which  was not helpful.  - Patient tried cyclobenzaprine which was not helpful.    4.  INTERVENTIONS: No interventions were ordered today.  Patient is going to trial conservative treatments first.  We will await the results of breast MRI.    5.  PATIENT EDUCATION: Patient is in agreement the above plan.  All questions were answered.    6.  FOLLOW-UP:   A nurse will call patient with results of his MRI lumbar spine.  At that time I will likely recommend that the patient trial MedX physical therapy and follow-up with me in 4 to 6 weeks versus return to the clinic sooner to review the results and discuss treatment options.  If he has questions or concerns in the meantime, he should not hesitate to call.      SUBJECTIVE:  Froy Hernández  Is a 26 year old male who presents today in consultation at request of Dr. Manuel for new patient evaluation of low back pain with intermittent radiation into the left lower extremity with intermittent left lower extremity weakness.  Patient reports that his pain began 1 year ago.  He states that he was at the gym and upon standing off of the exercise bike he had abrupt onset of low back pain.  He went home but the pain continued to worsen.  He went to urgent care.  They prescribed medications which were not helpful.  Later that same day he went to the emergency department and had a CT lumbar spine which showed a disc protrusion at L5-S1.  Patient reports that pain persisted after that but was manageable.  About 2 to 3 weeks ago his pain flared up.  He denies any injury to cause the flare.  2 weeks ago when he was walking at [facility] he states that his left leg started to feel very weak.  The weakness persisted for about 5 minutes and strength did come back but he had an abnormal sensation in the left leg.    Patient complains of centralized low back pain.  Pain is slightly worse on the left side than the right.  Pain is located in the lower lumbar region.  He sometimes feels it  radiate up toward the thoracolumbar junction.  He has intermittent pain that radiates into the left buttock and about USP down the left posterior lateral thigh.  Denies pain further down the leg.  He denies any numbness or tingling down the leg.  His pain is aggravated with sitting too long and bending.  He has difficulty donning and doffing socks and shoes because of this.  Pain is alleviated temporarily with applying heat and taking warm bath.  He denies any loss of bowel or bladder control.  Denies recent fevers, chills, sweats.      Patient has not had any treatments for this.  He has not had physical therapy.  He does not go to a chiropractor.  He has never had any spine surgeries or spine injections.  Patient just completed a Medrol Dosepak this morning.  He did not feel it was helpful.  He tried meloxicam 15 mg daily which was not helpful.  He tried cyclobenzaprine which was not helpful.      Current Outpatient Medications   Medication     cyclobenzaprine (FLEXERIL) 10 MG tablet     diclofenac (VOLTAREN) 1 % topical gel     fluocinonide emulsified base 0.05 % external cream     meloxicam (MOBIC) 15 MG tablet     methylPREDNISolone (MEDROL DOSEPAK) 4 MG tablet therapy pack         Allergies   Allergen Reactions     Penicillins Unknown     Other reaction(s): *Unknown           Patient Active Problem List   Diagnosis     Visual impairment     Viral warts     Tear of meniscus of knee     Sprain of anterior cruciate ligament of right knee     Other instability, left ankle     Nonspecific reaction to tuberculin test     Pain in left ankle and joints of left foot     Drug resistant tuberculosis     Closed fracture of middle or proximal phalanx or phalanges of hand     Chronic post-traumatic headache     Chronic allergic conjunctivitis     Allergic rhinitis     Sinus tarsi syndrome, left     Capsulitis of left ankle       Past Surgical History:   Procedure Laterality Date     ARTHROSCOPY ANKLE         Family  history: Father had cancer, mother had diabetes.    Social history: Patient is .  He is in [employer].  He denies tobacco use.  He drinks 2-3 alcoholic beverages per month.  Denies illicit drug use.      ROS: Positive for muscle pain, sciatica.  Specifically negative for bowel/bladder dysfunction, fevers,chills, appetite changes, unexplained weight loss.   Otherwise 13 systems reviewed are negative.  Please see the patient's intake questionnaire from today for details.      OBJECTIVE:  PHYSICAL EXAMINATION:    CONSTITUTIONAL:  Vital signs as above.  No acute distress.  The patient is well nourished and well groomed.  PSYCHIATRIC:  The patient is awake, alert, oriented to person, place, time and answering questions appropriately with clear speech.    HEENT: Normocephalic, atraumatic.  Sclera clear.  Neck is supple.  SKIN:  Skin over the face, bilateral lower extremities, and posterior torso is clean, dry, intact without rashes.    GAIT:  Gait is non-antalgic.  The patient is able to heel and toe walk without significant difficulty.    STANDING EXAMINATION: Mild tenderness palpation left lower lumbar paraspinous muscles.  Lumbar flexion mildly restricted with increased pain.  Lumbar extension within normal limits.  MUSCLE STRENGTH:  The patient has 5/5 strength for the bilateral hip flexors, knee flexors/extensors, ankle dorsiflexors/plantar flexors, great toe extensors, ankle evertors/invertors.  NEUROLOGICAL:  2/4 patellar, and achilles reflexes bilaterally.  Negative Babinski's bilaterally.  No ankle clonus bilaterally. Sensation to light touch is intact in the bilateral L4, L5, and S1 dermatomes.  VASCULAR:  2/4 dorsalis pedis pulses bilaterally.  Bilateral lower extremities are warm.  There is no pitting edema of the bilateral lower extremities.  ABDOMINAL:  Soft, non-distended, non-tender throughout all quadrants.  No pulsatile mass palpated in the left lower quadrant.  LYMPH NODES:  No palpable or tender  inguinal lymph nodes.  MUSCULOSKELETAL: Straight leg raise positive on the left to reproduce pain radiating into the left buttock.  Straight leg raise negative on the right.    RESULTS: I reviewed the CT lumbar spine from [facility] dated April 25, 2021.  At L5-S1 there is a small left paracentral disc protrusion minimally contacting the left S1 nerve root and lateral recess.  There is also a mild disc bulge at L4-5 no significant neural compromise.

## 2022-04-18 ENCOUNTER — OFFICE VISIT (OUTPATIENT)
Dept: PHYSICAL MEDICINE AND REHAB | Facility: CLINIC | Age: 27
End: 2022-04-18
Attending: INTERNAL MEDICINE
Payer: OTHER GOVERNMENT

## 2022-04-18 VITALS
DIASTOLIC BLOOD PRESSURE: 78 MMHG | HEIGHT: 70 IN | TEMPERATURE: 98.1 F | SYSTOLIC BLOOD PRESSURE: 119 MMHG | WEIGHT: 229.7 LBS | HEART RATE: 76 BPM | BODY MASS INDEX: 32.88 KG/M2

## 2022-04-18 DIAGNOSIS — M51.16 INTERVERTEBRAL DISC DISORDERS WITH RADICULOPATHY, LUMBAR REGION: ICD-10-CM

## 2022-04-18 DIAGNOSIS — M51.26 PROTRUDED LUMBAR DISC: ICD-10-CM

## 2022-04-18 DIAGNOSIS — M54.16 LUMBAR RADICULAR PAIN: Primary | ICD-10-CM

## 2022-04-18 PROCEDURE — 99204 OFFICE O/P NEW MOD 45 MIN: CPT | Performed by: PHYSICIAN ASSISTANT

## 2022-04-18 RX ORDER — GABAPENTIN 300 MG/1
CAPSULE ORAL
Qty: 90 CAPSULE | Refills: 0 | Status: SHIPPED | OUTPATIENT
Start: 2022-04-18 | End: 2023-01-25

## 2022-04-18 ASSESSMENT — PAIN SCALES - GENERAL: PAINLEVEL: MILD PAIN (3)

## 2022-04-18 NOTE — PATIENT INSTRUCTIONS
[facility] Scheduling    Please call [phone] to schedule your image(s) (select option#1). There are 3 different locations, see below. You can do walk-in visits for xray only images if you want.     [facility]  [address] [place] [id number]    [place] [id number] [facility] [id number]    Prescribed Gabapentin today, 300 mg tablets, to be titrated up to 1 tablets 3 times a day as tolerated for your nerve pain. Please follow Gabapentin dosing chart below.    Gabapentin 300mg Dosing Chart    DATE  MORNING AFTERNOON BEDTIME    Day 1 0 0 1    Day 2 0 0 1    Day 3 0 0 1    Day 4 1 0 1    Day 5 1 0 1    Day 6 1 0 1    Day 7 1 1 1    Day 8 1 1 1    Day 9 1 1 1                                                                                                                                   Continue medication, taking 1 capsules three times daily  Please call if you have any questions regarding how to take your medication       An order for physicaltherapy has been provided today.  Someone will call you to schedule physical therapy or you can call [phone] to schedule physical therapy.  It will be very important for you to do your physical therapy exercises on aregular basis to decrease your pain and prevent future flares of pain.

## 2022-04-18 NOTE — LETTER
4/18/2022         RE: Froy Hernández  [address] [facility]  [address] [place] [id number]        Dear Colleague,    Thank you for referring your patient, Froy Hernández, to the [facility]. Please see a copy of my visit note below.    ASSESSMENT: Froy Hernández is a 26 year old male who is otherwise healthy who presents today for new patient evaluation of acute on chronic (right low back pain with intermittent radiation into the left lower extremity with intermittent subjective left lower extremity weakness.  Pain has been present for 1 year but flared up 2 to 3 weeks ago.  My review of a CT lumbar spine from April 2021 showed a small left L5-S1 disc protrusion minimally contacting the left S1 nerve root in the lateral recess.  There was also a mild disc bulge at L4-5 but no neural compromise.  I am concerned he has had progression of the disc protrusion.  He is neurologically intact on exam today.    PLAN:  A shared decision making model was used.  The patient's values and choices were respected.  The following represents what was discussed and decided upon by the physician assistant and the patient.      1.  DIAGNOSTIC TESTS: I reviewed the CT lumbar spine from April 2021.  I did an MRI lumbar spine for further evaluation.  Symptoms have been present for more than 1 year.  He is now experiencing intermittent weakness.    2.  PHYSICAL THERAPY:  Discussed the importance of core strengthening, ROM, stretching exercises with the patient and how each of these entities is important in decreasing pain.  Explained to the patient that the purpose of physical therapy is to teach the patient a home exercise program.  These exercises need to be performed every day in order to decrease pain and prevent future occurrences of pain.  I entered a referral to physical therapy.  I would like him to do the Med[facility] program.    3.  MEDICATIONS:    -Gabapentin 300 mg was  prescribed.  He was given the dosage titration chart.  He may increase his dose up to 300 mg 3 times daily.  We can potentially titrate his dose higher, depending on his response.  - Patient completed a Medrol Dosepak today.  He is not sure if it helps.  - Patient tried meloxicam 15 mg which was not helpful.  - Patient tried cyclobenzaprine which was not helpful.    4.  INTERVENTIONS: No interventions were ordered today.  Patient is going to trial conservative treatments first.  We will await the results of breast MRI.    5.  PATIENT EDUCATION: Patient is in agreement the above plan.  All questions were answered.    6.  FOLLOW-UP:   A nurse will call patient with results of his MRI lumbar spine.  At that time I will likely recommend that the patient trial MedX physical therapy and follow-up with me in 4 to 6 weeks versus return to the clinic sooner to review the results and discuss treatment options.  If he has questions or concerns in the meantime, he should not hesitate to call.      SUBJECTIVE:  Froy Hernández  Is a 26 year old male who presents today in consultation at request of Dr. Manuel for new patient evaluation of low back pain with intermittent radiation into the left lower extremity with intermittent left lower extremity weakness.  Patient reports that his pain began 1 year ago.  He states that he was at the gym and upon standing off of the exercise bike he had abrupt onset of low back pain.  He went home but the pain continued to worsen.  He went to urgent care.  They prescribed medications which were not helpful.  Later that same day he went to the emergency department and had a CT lumbar spine which showed a disc protrusion at L5-S1.  Patient reports that pain persisted after that but was manageable.  About 2 to 3 weeks ago his pain flared up.  He denies any injury to cause the flare.  2 weeks ago when he was walking at [facility] he states that his left leg started to feel very weak.  The  weakness persisted for about 5 minutes and strength did come back but he had an abnormal sensation in the left leg.    Patient complains of centralized low back pain.  Pain is slightly worse on the left side than the right.  Pain is located in the lower lumbar region.  He sometimes feels it radiate up toward the thoracolumbar junction.  He has intermittent pain that radiates into the left buttock and about alf down the left posterior lateral thigh.  Denies pain further down the leg.  He denies any numbness or tingling down the leg.  His pain is aggravated with sitting too long and bending.  He has difficulty donning and doffing socks and shoes because of this.  Pain is alleviated temporarily with applying heat and taking warm bath.  He denies any loss of bowel or bladder control.  Denies recent fevers, chills, sweats.      Patient has not had any treatments for this.  He has not had physical therapy.  He does not go to a chiropractor.  He has never had any spine surgeries or spine injections.  Patient just completed a Medrol Dosepak this morning.  He did not feel it was helpful.  He tried meloxicam 15 mg daily which was not helpful.  He tried cyclobenzaprine which was not helpful.      Current Outpatient Medications   Medication     cyclobenzaprine (FLEXERIL) 10 MG tablet     diclofenac (VOLTAREN) 1 % topical gel     fluocinonide emulsified base 0.05 % external cream     meloxicam (MOBIC) 15 MG tablet     methylPREDNISolone (MEDROL DOSEPAK) 4 MG tablet therapy pack         Allergies   Allergen Reactions     Penicillins Unknown     Other reaction(s): *Unknown           Patient Active Problem List   Diagnosis     Visual impairment     Viral warts     Tear of meniscus of knee     Sprain of anterior cruciate ligament of right knee     Other instability, left ankle     Nonspecific reaction to tuberculin test     Pain in left ankle and joints of left foot     Drug resistant tuberculosis     Closed fracture of middle  or proximal phalanx or phalanges of hand     Chronic post-traumatic headache     Chronic allergic conjunctivitis     Allergic rhinitis     Sinus tarsi syndrome, left     Capsulitis of left ankle       Past Surgical History:   Procedure Laterality Date     ARTHROSCOPY ANKLE         Family history: Father had cancer, mother had diabetes.    Social history: Patient is .  He is in [employer].  He denies tobacco use.  He drinks 2-3 alcoholic beverages per month.  Denies illicit drug use.      ROS: Positive for muscle pain, sciatica.  Specifically negative for bowel/bladder dysfunction, fevers,chills, appetite changes, unexplained weight loss.   Otherwise 13 systems reviewed are negative.  Please see the patient's intake questionnaire from today for details.      OBJECTIVE:  PHYSICAL EXAMINATION:    CONSTITUTIONAL:  Vital signs as above.  No acute distress.  The patient is well nourished and well groomed.  PSYCHIATRIC:  The patient is awake, alert, oriented to person, place, time and answering questions appropriately with clear speech.    HEENT: Normocephalic, atraumatic.  Sclera clear.  Neck is supple.  SKIN:  Skin over the face, bilateral lower extremities, and posterior torso is clean, dry, intact without rashes.    GAIT:  Gait is non-antalgic.  The patient is able to heel and toe walk without significant difficulty.    STANDING EXAMINATION: Mild tenderness palpation left lower lumbar paraspinous muscles.  Lumbar flexion mildly restricted with increased pain.  Lumbar extension within normal limits.  MUSCLE STRENGTH:  The patient has 5/5 strength for the bilateral hip flexors, knee flexors/extensors, ankle dorsiflexors/plantar flexors, great toe extensors, ankle evertors/invertors.  NEUROLOGICAL:  2/4 patellar, and achilles reflexes bilaterally.  Negative Babinski's bilaterally.  No ankle clonus bilaterally. Sensation to light touch is intact in the bilateral L4, L5, and S1 dermatomes.  VASCULAR:  2/4 dorsalis pedis  pulses bilaterally.  Bilateral lower extremities are warm.  There is no pitting edema of the bilateral lower extremities.  ABDOMINAL:  Soft, non-distended, non-tender throughout all quadrants.  No pulsatile mass palpated in the left lower quadrant.  LYMPH NODES:  No palpable or tender inguinal lymph nodes.  MUSCULOSKELETAL: Straight leg raise positive on the left to reproduce pain radiating into the left buttock.  Straight leg raise negative on the right.    RESULTS: I reviewed the CT lumbar spine from [facility] dated April 25, 2021.  At L5-S1 there is a small left paracentral disc protrusion minimally contacting the left S1 nerve root and lateral recess.  There is also a mild disc bulge at L4-5 no significant neural compromise.        Again, thank you for allowing me to participate in the care of your patient.        Sincerely,        Paulette Hairston PA-C

## 2022-06-28 ENCOUNTER — HOSPITAL ENCOUNTER (EMERGENCY)
Facility: CLINIC | Age: 27
Discharge: HOME | End: 2022-06-28
Admitting: EMERGENCY MEDICINE
Payer: OTHER GOVERNMENT

## 2022-06-28 VITALS
HEIGHT: 70 IN | RESPIRATION RATE: 19 BRPM | WEIGHT: 220 LBS | DIASTOLIC BLOOD PRESSURE: 66 MMHG | TEMPERATURE: 98.6 F | HEART RATE: 86 BPM | BODY MASS INDEX: 31.5 KG/M2 | OXYGEN SATURATION: 98 % | SYSTOLIC BLOOD PRESSURE: 120 MMHG

## 2022-06-28 DIAGNOSIS — M54.42 ACUTE LEFT-SIDED LOW BACK PAIN WITH LEFT-SIDED SCIATICA: ICD-10-CM

## 2022-06-28 PROCEDURE — 96372 THER/PROPH/DIAG INJ SC/IM: CPT | Performed by: EMERGENCY MEDICINE

## 2022-06-28 PROCEDURE — 93005 ELECTROCARDIOGRAM TRACING: CPT | Performed by: EMERGENCY MEDICINE

## 2022-06-28 PROCEDURE — 99285 EMERGENCY DEPT VISIT HI MDM: CPT | Mod: 25

## 2022-06-28 PROCEDURE — 250N000011 HC RX IP 250 OP 636: Performed by: EMERGENCY MEDICINE

## 2022-06-28 RX ORDER — KETOROLAC TROMETHAMINE 15 MG/ML
30 INJECTION, SOLUTION INTRAMUSCULAR; INTRAVENOUS ONCE
Status: COMPLETED | OUTPATIENT
Start: 2022-06-28 | End: 2022-06-28

## 2022-06-28 RX ORDER — HYDROMORPHONE HYDROCHLORIDE 1 MG/ML
0.5 INJECTION, SOLUTION INTRAMUSCULAR; INTRAVENOUS; SUBCUTANEOUS ONCE
Status: COMPLETED | OUTPATIENT
Start: 2022-06-28 | End: 2022-06-28

## 2022-06-28 RX ORDER — OXYCODONE HYDROCHLORIDE 5 MG/1
5 TABLET ORAL EVERY 6 HOURS PRN
Qty: 10 TABLET | Refills: 0 | Status: SHIPPED | OUTPATIENT
Start: 2022-06-28 | End: 2022-07-01

## 2022-06-28 RX ORDER — METHYLPREDNISOLONE 4 MG
TABLET, DOSE PACK ORAL
Qty: 21 TABLET | Refills: 0 | Status: SHIPPED | OUTPATIENT
Start: 2022-06-28 | End: 2023-01-25

## 2022-06-28 RX ADMIN — HYDROMORPHONE HYDROCHLORIDE 0.5 MG: 1 INJECTION, SOLUTION INTRAMUSCULAR; INTRAVENOUS; SUBCUTANEOUS at 16:50

## 2022-06-28 RX ADMIN — KETOROLAC TROMETHAMINE 30 MG: 15 INJECTION, SOLUTION INTRAMUSCULAR; INTRAVENOUS at 16:53

## 2022-06-28 ASSESSMENT — ENCOUNTER SYMPTOMS
WEAKNESS: 0
SHORTNESS OF BREATH: 1
DYSURIA: 0
CHILLS: 0
DIFFICULTY URINATING: 0
FEVER: 0
NAUSEA: 0
HEMATURIA: 0
NUMBNESS: 0
VOMITING: 0
BACK PAIN: 1
ABDOMINAL PAIN: 0

## 2022-06-28 NOTE — DISCHARGE INSTRUCTIONS
You are seen here today for evaluation of back pain.  As we discussed, this is likely radiculopathy related to your previous bulging disc.    You may take Tylenol and ibuprofen for pain/fever, do not exceed 4000 mg of Tylenol per day or 3200 mg ofibuprofen per day.  Take oxycodone as needed for breakthrough pain-This is a narcotic pain medication that might be habit forming so only take when necessary. Do not drink alcohol,drive, or operate machinery while taking this medication.     I will also prescribe you a Medrol Dosepak, take as prescribed to help reduce inflammation.    Use ice or heat, 20 minutes/h.    Sample regimen:   1000 mg Tylenol at 08:00 AM   800 mg ibuprofen at 10:00 AM   1000 mg Tylenol at 12:00 PM   800 mg ibuprofen at 4:00 PM   1000 mg Tylenol at 6:00 PM   800 mg ibuprofen at 10:00pm

## 2022-06-28 NOTE — ED TRIAGE NOTES
Patient presents to the ED with c/o mid back pain that radiates to his left hip. Patient states he has had back pain for a while but over the last two days it has gotten worse. Patient also reports feeling short of breath 1 hour prior to arrival that he noticed when he was trying to stand up to relieve his back pain. EKG obtained in the ED.     Triage Assessment     Row Name 06/28/22 1506       Triage Assessment (Adult)    Airway WDL WDL;X  felt short of breath 1 hour prior to arrival, states that he vapes       Respiratory WDL    Respiratory WDL X  felt short of breath one hour prior to arrival, pt states he vapes       Skin Circulation/Temperature WDL    Skin Circulation/Temperature WDL WDL       Cardiac WDL    Cardiac WDL WDL       Peripheral/Neurovascular WDL    Peripheral Neurovascular WDL WDL       Cognitive/Neuro/Behavioral WDL    Cognitive/Neuro/Behavioral WDL WDL

## 2022-06-28 NOTE — ED PROVIDER NOTES
EMERGENCY DEPARTMENT ENCOUNTER      NAME: Froy Hernández  AGE: 26 year old male  YOB: 1995  MRN: [id number]  EVALUATION DATE & TIME: 6/28/2022  3:20 PM    PCP: Bobby Myers    ED PROVIDER: Kayy Lorenzo PA-C      Chief Complaint   Patient presents with     Back Pain     Shortness of Breath         FINAL IMPRESSION:  1. Acute left-sided low back pain with left-sided sciatica          ED COURSE & MEDICAL DECISION MAKING:    Pertinent Labs & Imaging studies reviewed. (See chart for details)    26 year old male presents to the Emergency Department for evaluation of left lower back pain.    Physical exam is remarkable for an uncomfortable appearing patient.  He is standing and switches positions frequently.  He has paraspinal tenderness to palpation along the distal thoracic spine and lumbar spine, no step-offs are noted.  He has a heat pack on his back as well.  Strength is 5 out of 5 in the upper and lower extremities bilaterally.  Abdomen is soft and nontender.  Vital signs are stable and he is afebrile.    I do not think any emergent labs or imaging are indicated at this time.  The patient has had imaging of the spine which shows a herniated disc with some abutment on the L5 nerve root.  Patient is already established with spine.  He denies any symptoms concerning for red flag back conditions like cauda equina syndrome including bowel or bladder incontinence, saddle anesthesia, or lower extremity weakness.  He denies any abdominal pain or urinary symptoms concerning for intra-abdominal pathology/renal colic.  The patient did complain of shortness of breath that onset just prior to arrival but he does not appear short of breath on my exam.  His EKG is nonischemic and he is PERC negative so I do not think evaluation for PE is indicated.  The patient was given IM Toradol and Dilaudid here with improvement in his pain.  Advised him to follow-up with spine and physical therapy as planned, he was  also provided with prescriptions for Medrol Dosepak and oxycodone.   reviewed with no recent narcotic prescriptions.  Advised return here for any new or worsening symptoms.  The patient is agreeable with this treatment plan and verbalized his understanding.    ED Course   4:21 PM Performed my initial history and physical exam. Discussed workup in the emergency department, management of symptoms, and likely disposition. I discussed the plan for discharge with the patient, and patient is agreeable. We discussed supportive cares at home and reasons for return to the ER including new or worsening symptoms - all questions and concerns addressed. Patient to be discharged by RN.    At the conclusion of the encounter I discussed the results of all of the tests and the disposition. The questions were answered. The patient or family acknowledged understanding and was agreeable with the care plan.     Voice recognition software was used in the creation of this note. Any grammatical or nonsensical errors are due to inherent errors with the software and are not the intention of the writer.     MEDICATIONS GIVEN IN THE EMERGENCY:  Medications   ketorolac (TORADOL) injection 30 mg (30 mg Intramuscular Given 6/28/22 1653)   HYDROmorphone (PF) (DILAUDID) injection 0.5 mg (0.5 mg Intramuscular Given 6/28/22 1650)       NEW PRESCRIPTIONS STARTED AT TODAY'S ER VISIT  Discharge Medication List as of 6/28/2022  5:07 PM      START taking these medications    Details   !! methylPREDNISolone (MEDROL DOSEPAK) 4 MG tablet therapy pack Follow Package Directions, Disp-21 tablet, R-0, Local Print      oxyCODONE (ROXICODONE) 5 MG tablet Take 1 tablet (5 mg) by mouth every 6 hours as needed for pain, Disp-10 tablet, R-0, Local Print       !! - Potential duplicate medications found. Please discuss with provider.               =================================================================    HPI    Patient information was obtained from:  Patient     Use of : N/A       Froy Hernández is a 26 year old male with PMH of chronic headaches who presents to the ED via walk-in for evaluation of lower back pain.     The patient reports that since last night, he has had pain in the left mid-lower back that radiates down towards his left hip. He describes the pain as sharp in nature. He states he has had something similar about a year ago which improved with medications. He denies any recent falls or traumas. The patient also endorses new onset of shortness of breath about one hour prior to arrival, which he attributes to pain. He has tried Flexeril with minimal improvement. He denies changes in bowel or bladder habits, saddle anesthesia, fevers.       REVIEW OF SYSTEMS   Review of Systems   Constitutional: Negative for chills and fever.   Respiratory: Positive for shortness of breath.    Cardiovascular: Negative for chest pain.   Gastrointestinal: Negative for abdominal pain, nausea and vomiting.   Genitourinary: Negative for difficulty urinating, dysuria and hematuria.   Musculoskeletal: Positive for back pain.   Neurological: Negative for weakness and numbness.        Denies bowel or bladder incontinence, saddle anesthesia       All other systems reviewed and are negative unless noted in HPI.      PAST MEDICAL HISTORY:  No past medical history on file.    PAST SURGICAL HISTORY:  Past Surgical History:   Procedure Laterality Date     ARTHROSCOPY ANKLE         CURRENT MEDICATIONS:    methylPREDNISolone (MEDROL DOSEPAK) 4 MG tablet therapy pack  oxyCODONE (ROXICODONE) 5 MG tablet  cyclobenzaprine (FLEXERIL) 10 MG tablet  diclofenac (VOLTAREN) 1 % topical gel  fluocinonide emulsified base 0.05 % external cream  gabapentin (NEURONTIN) 300 MG capsule  meloxicam (MOBIC) 15 MG tablet  methylPREDNISolone (MEDROL DOSEPAK) 4 MG tablet therapy pack        ALLERGIES:  Allergies   Allergen Reactions     Penicillins Unknown     Other reaction(s):  "*Unknown       FAMILY HISTORY:  No family history on file.    SOCIAL HISTORY:   Social History     Socioeconomic History     Marital status:    Tobacco Use     Smoking status: Former Smoker     Quit date: 2020     Years since quittin.5     Smokeless tobacco: Never Used     Tobacco comment: e-cig   Substance and Sexual Activity     Alcohol use: Not Currently     Sexual activity: Yes     Partners: Female       VITALS:  Patient Vitals for the past 24 hrs:   BP Temp Temp src Pulse Resp SpO2 Height Weight   22 1712 120/66 -- -- 86 -- 98 % -- --   22 1504 (!) 144/95 98.6  F (37  C) Oral 98 19 95 % 1.778 m (5' 10\") 99.8 kg (220 lb)       PHYSICAL EXAM    VITAL SIGNS: /66   Pulse 86   Temp 98.6  F (37  C) (Oral)   Resp 19   Ht 1.778 m (5' 10\")   Wt 99.8 kg (220 lb)   SpO2 98%   BMI 31.57 kg/m    General Appearance: Uncomfortable appearing; Alert, cooperative, normal speech and facial symmetry, appears stated age  Head:  Normocephalic, without obvious abnormality, atraumatic  Cardio:  Regular rate and rhythm, S1 and S2 normal, no murmur, rub    or gallop, 2+ pulses symmetric in all extremities  Pulm:  Clear to auscultation bilaterally, respirations unlabored with no accessory muscle use  Back:  Paraspinal tenderness to palpation along the distal thoracic spine and lumbar spine, no step-offs are noted.  He has a heat pack on his back as well. Symmetric, no curvature, ROM normal  Abdomen:  Abdomen is soft, non-distended with no tenderness to palpation, rebound tenderness, or guarding.   Extremities:  Extremities normal, there is no tenderness to palpation , atraumatic, no cyanosis or edema, full function and range of motion, pulses equal in all extremities, normal cap refill, no joint swelling; strength is 5/5 in the upper and lower extremities bilaterally  Neuro: Patient is awake, alert, and responsive to voice. No gross motor weaknesses or sensory loss; moves all extremities.   "   LAB:  All pertinent labs reviewed and interpreted.  Labs Ordered and Resulted from Time of ED Arrival to Time of ED Departure - No data to display    RADIOLOGY:  Reviewed all pertinent imaging. Please see official radiology report.  No orders to display       EKG:    Performed at: 15:10    I have independently reviewed and interpreted the EKG, along with the final read. EKG also reviewed by Dr. Jonas.    Ventricular rate 93 bpm  WY interval 134 ms  QRS duration 96 ms  QT/QTc 364/452 ms  P-R-T axes 38 -14 46    Impression: Sinus rhythm    Kayy Lorenzo PA-C  Emergency Medicine  [facility] EMERGENCY ROOM  [facility] [facility] [address]  [phone]  Dept: [phone]       Kayy Lorenzo PA-C  06/28/22 6946

## 2022-06-29 LAB
ATRIAL RATE - MUSE: 93 BPM
DIASTOLIC BLOOD PRESSURE - MUSE: NORMAL MMHG
INTERPRETATION ECG - MUSE: NORMAL
P AXIS - MUSE: 38 DEGREES
PR INTERVAL - MUSE: 134 MS
QRS DURATION - MUSE: 96 MS
QT - MUSE: 364 MS
QTC - MUSE: 452 MS
R AXIS - MUSE: -14 DEGREES
SYSTOLIC BLOOD PRESSURE - MUSE: NORMAL MMHG
T AXIS - MUSE: 46 DEGREES
VENTRICULAR RATE- MUSE: 93 BPM

## 2022-09-22 ENCOUNTER — HOSPITAL ENCOUNTER (EMERGENCY)
Facility: CLINIC | Age: 27
Discharge: HOME | End: 2022-09-22
Attending: EMERGENCY MEDICINE | Admitting: EMERGENCY MEDICINE
Payer: OTHER GOVERNMENT

## 2022-09-22 VITALS
SYSTOLIC BLOOD PRESSURE: 124 MMHG | DIASTOLIC BLOOD PRESSURE: 70 MMHG | HEIGHT: 70 IN | WEIGHT: 222 LBS | HEART RATE: 100 BPM | OXYGEN SATURATION: 95 % | RESPIRATION RATE: 16 BRPM | BODY MASS INDEX: 31.78 KG/M2 | TEMPERATURE: 98 F

## 2022-09-22 DIAGNOSIS — S76.319A PARTIAL HAMSTRING TEAR, INITIAL ENCOUNTER: ICD-10-CM

## 2022-09-22 PROCEDURE — 250N000013 HC RX MED GY IP 250 OP 250 PS 637: Performed by: EMERGENCY MEDICINE

## 2022-09-22 PROCEDURE — 99283 EMERGENCY DEPT VISIT LOW MDM: CPT

## 2022-09-22 RX ORDER — HYDROCODONE BITARTRATE AND ACETAMINOPHEN 5; 325 MG/1; MG/1
1 TABLET ORAL EVERY 6 HOURS PRN
Qty: 10 TABLET | Refills: 0 | Status: SHIPPED | OUTPATIENT
Start: 2022-09-22 | End: 2022-09-25

## 2022-09-22 RX ORDER — IBUPROFEN 600 MG/1
600 TABLET, FILM COATED ORAL ONCE
Status: COMPLETED | OUTPATIENT
Start: 2022-09-22 | End: 2022-09-22

## 2022-09-22 RX ORDER — IBUPROFEN 200 MG
400 TABLET ORAL EVERY 8 HOURS PRN
Qty: 60 TABLET | Refills: 0 | Status: SHIPPED | OUTPATIENT
Start: 2022-09-22 | End: 2023-01-25

## 2022-09-22 RX ADMIN — IBUPROFEN 600 MG: 600 TABLET ORAL at 22:26

## 2022-09-22 ASSESSMENT — ENCOUNTER SYMPTOMS: MYALGIAS: 1

## 2022-09-22 NOTE — Clinical Note
Froy Hernández was seen and treated in our emergency department on 9/22/2022.  He may return to work on 09/27/2022.       If you have any questions or concerns, please don't hesitate to call.      Eleazar Michaud MD

## 2022-09-23 NOTE — DISCHARGE INSTRUCTIONS
Crutch walk for the next 3 to 5 days and gradually increase weightbearing.  Expect gradual healing over 3 to 4 weeks

## 2022-09-23 NOTE — ED TRIAGE NOTES
Playing softball, pt was sprinting to first base. When he put his L foot on the base, felt sudden pop and pain to posterior L upper leg.     Triage Assessment     Row Name 09/22/22 1698       Triage Assessment (Adult)    Airway WDL WDL       Respiratory WDL    Respiratory WDL WDL       Skin Circulation/Temperature WDL    Skin Circulation/Temperature WDL WDL       Cardiac WDL    Cardiac WDL WDL       Peripheral/Neurovascular WDL    Peripheral Neurovascular WDL WDL       Cognitive/Neuro/Behavioral WDL    Cognitive/Neuro/Behavioral WDL WDL

## 2022-09-23 NOTE — ED PROVIDER NOTES
EMERGENCY DEPARTMENT ENCOUNTER      NAME: Froy Hernández  AGE: 26 year old male  YOB: 1995  MRN: [id number]  EVALUATION DATE & TIME: 9/22/2022  9:56 PM    PCP: Bobby Myers    ED PROVIDER: Eleazar Michaud MD      Chief Complaint   Patient presents with     Leg Injury         FINAL IMPRESSION:  Partial left hamstring tear      ED COURSE & MEDICAL DECISION MAKING:    Pertinent Labs & Imaging studies reviewed. (See chart for details)  26 year old male presents to the Emergency Department for evaluation of posterior left leg injury.  Patient was running in a softball game when he felt a sudden pop and sharp pain in the back of his left thigh.  With walking now he gets a sharp stabbing discomfort.  Denies any other injuries.  On exam knee is stable without effusion or laxity.  Patella tendon intact.  Patient with marked tenderness posterior mid thigh suggestive of both partial hamstring tear.  Hamstring tendons themselves intact.  Symptomatic relief discussed.  Crutch walk for several days and then partial weightbearing.  Patient understood this well..     10:13 PM I met with patient for initial interview and encounter. PPE worn includes surgical mask.      At the conclusion of the encounter I discussed the results of all of the tests and the disposition. The questions were answered. The patient or family acknowledged understanding and was agreeable with the care plan.     MEDICATIONS GIVEN IN THE EMERGENCY:  Medications   ibuprofen (ADVIL/MOTRIN) tablet 600 mg (has no administration in time range)       NEW PRESCRIPTIONS STARTED AT TODAY'S ER VISIT  Current Discharge Medication List      START taking these medications    Details   HYDROcodone-acetaminophen (NORCO) 5-325 MG tablet Take 1 tablet by mouth every 6 hours as needed for severe pain (7-10)  Qty: 10 tablet, Refills: 0      ibuprofen (ADVIL/MOTRIN) 200 MG tablet Take 2 tablets (400 mg) by mouth every 8 hours as needed  Qty: 60 tablet,  "Refills: 0                =================================================================    HPI    Patient information was obtained from: Patient    Use of : N/A       Froy Hernández is a 26 year old male with no pertinent history who presents to this ED via walk in for evaluation of leg injury.     Patient presents with leg injury when he was playing softball and sprinted to first base. He reports putting his left foot on the base, he then felt a sudden pop and pain to posterior left upper leg.       REVIEW OF SYSTEMS   Review of Systems   Musculoskeletal: Positive for myalgias (left upper leg).   All other systems reviewed and are negative.       PAST MEDICAL HISTORY:  No past medical history on file.    PAST SURGICAL HISTORY:  Past Surgical History:   Procedure Laterality Date     ARTHROSCOPY ANKLE             CURRENT MEDICATIONS:    cyclobenzaprine (FLEXERIL) 10 MG tablet  diclofenac (VOLTAREN) 1 % topical gel  fluocinonide emulsified base 0.05 % external cream  gabapentin (NEURONTIN) 300 MG capsule  meloxicam (MOBIC) 15 MG tablet  methylPREDNISolone (MEDROL DOSEPAK) 4 MG tablet therapy pack  methylPREDNISolone (MEDROL DOSEPAK) 4 MG tablet therapy pack        ALLERGIES:  Allergies   Allergen Reactions     Penicillins Unknown     Other reaction(s): *Unknown       FAMILY HISTORY:  No family history on file.    SOCIAL HISTORY:   Social History     Socioeconomic History     Marital status:    Tobacco Use     Smoking status: Former Smoker     Quit date: 2020     Years since quittin.8     Smokeless tobacco: Never Used     Tobacco comment: e-cig   Substance and Sexual Activity     Alcohol use: Not Currently     Sexual activity: Yes     Partners: Female       VITALS:  /78   Pulse 100   Temp 98  F (36.7  C) (Temporal)   Resp 16   Ht 1.778 m (5' 10\")   Wt 100.7 kg (222 lb)   SpO2 95%   BMI 31.85 kg/m      PHYSICAL EXAM    Constitutional: Well developed, Well " nourished,mild distress  HENT: Normocephalic, Atraumatic, Bilateral external ears normal, Oropharynx normal, mucous membranes moist, Nose normal. Neck - Normal range of motion, No tenderness, Supple, No stridor.    Eyes: PERRL, EOMI, Conjunctiva normal, No discharge.   Musculoskeletal: No edema.  No cyanosis, No clubbing.  Tenderness to posterior left mid thigh, hamstring tendon still intact.  Knee stable without laxity.  No midline joint tenderness.  Patella tendon nontender.  Patient able to extend leg  Integument: Warm, Dry, No erythema, No rash. No petechiae.  tattoos.   Neurologic: Normal motor function, Normal sensory function, No focal deficits noted. Normal gait.    Psychiatric: Affect normal, Judgment normal, Mood normal. Cooperative.               I, Penny Rocha, am serving as a scribe to document services personally performed by Eleazar Michaud MD, based on my observation and the provider's statements to me. I, Eleazar Michaud MD, attest that Penny Rocha is acting in a scribe capacity, has observed my performance of the services and has documented them in accordance with my direction.    Eleazar Michaud MD  [facility] EMERGENCY ROOM  [address] [address] [phone]       Eleazar Michaud MD  09/22/22 6821

## 2022-12-30 ENCOUNTER — ANCILLARY PROCEDURE (OUTPATIENT)
Dept: GENERAL RADIOLOGY | Facility: CLINIC | Age: 27
End: 2022-12-30
Attending: EMERGENCY MEDICINE
Payer: OTHER GOVERNMENT

## 2022-12-30 ENCOUNTER — OFFICE VISIT (OUTPATIENT)
Dept: FAMILY MEDICINE | Facility: CLINIC | Age: 27
End: 2022-12-30
Payer: OTHER GOVERNMENT

## 2022-12-30 VITALS
DIASTOLIC BLOOD PRESSURE: 81 MMHG | SYSTOLIC BLOOD PRESSURE: 129 MMHG | OXYGEN SATURATION: 95 % | TEMPERATURE: 97.4 F | HEART RATE: 86 BPM | RESPIRATION RATE: 14 BRPM

## 2022-12-30 DIAGNOSIS — S89.91XA KNEE INJURY, RIGHT, INITIAL ENCOUNTER: Primary | ICD-10-CM

## 2022-12-30 DIAGNOSIS — S89.91XA KNEE INJURY, RIGHT, INITIAL ENCOUNTER: ICD-10-CM

## 2022-12-30 PROCEDURE — 73562 X-RAY EXAM OF KNEE 3: CPT | Mod: TC | Performed by: RADIOLOGY

## 2022-12-30 PROCEDURE — 99213 OFFICE O/P EST LOW 20 MIN: CPT | Performed by: EMERGENCY MEDICINE

## 2022-12-30 NOTE — PROGRESS NOTES
Impression:  Right knee injury with possible ACL and MCL injuries.    Plan:  Knee immobilizer, ice, Tylenol for pain, follow-up with orthopedic surgery      Chief Complaint:  Patient presents with:  Knee Injury: Was playing basketball yesterday landed wrong on rt knee heard a pop noise painful since          HPI:   Froy Hernández is a 27 year old male who presents to this clinic for the evaluation of right knee pain.  Patient was playing basketball yesterday when he landed awkwardly on his right leg heard a pop and felt instant pain in the right knee.  He is unable to bear weight.  No other injuries.  No numbness or weakness.  He has previously had the anterior cruciate ligament repaired in [place] about 5 years ago      PMH:   No past medical history on file.  Past Surgical History:   Procedure Laterality Date     ARTHROSCOPY ANKLE           ROS:  All other systems negative    Meds:    Current Outpatient Medications:      cyclobenzaprine (FLEXERIL) 10 MG tablet, Take 1 tablet (10 mg) by mouth 3 times daily as needed for muscle spasms (Patient not taking: Reported on 4/18/2022), Disp: 30 tablet, Rfl: 0     diclofenac (VOLTAREN) 1 % topical gel, Apply 2 g topically 4 times daily (Patient not taking: Reported on 4/18/2022), Disp: 150 g, Rfl: 1     fluocinonide emulsified base 0.05 % external cream, [place] to affected areas BID until clear (Patient not taking: Reported on 4/18/2022), Disp: 60 g, Rfl: 0     gabapentin (NEURONTIN) 300 MG capsule, Take 1 capsule (300 mg) by mouth At Bedtime for 3 days, THEN 1 capsule (300 mg) 2 times daily for 3 days, THEN 1 capsule (300 mg) 3 times daily., Disp: 90 capsule, Rfl: 0     ibuprofen (ADVIL/MOTRIN) 200 MG tablet, Take 2 tablets (400 mg) by mouth every 8 hours as needed (Patient not taking: Reported on 12/30/2022), Disp: 60 tablet, Rfl: 0     meloxicam (MOBIC) 15 MG tablet, Take 1 tablet (15 mg) by mouth daily (Patient not taking: Reported on 4/18/2022), Disp: 30  tablet, Rfl: 0     methylPREDNISolone (MEDROL DOSEPAK) 4 MG tablet therapy pack, Follow Package Directions (Patient not taking: Reported on 2022), Disp: 21 tablet, Rfl: 0     methylPREDNISolone (MEDROL DOSEPAK) 4 MG tablet therapy pack, Follow Package Directions (Patient not taking: Reported on 2022), Disp: 21 tablet, Rfl: 0        Social:  Social History     Socioeconomic History     Marital status:      Spouse name: Not on file     Number of children: Not on file     Years of education: Not on file     Highest education level: Not on file   Occupational History     Not on file   Tobacco Use     Smoking status: Former     Types: Cigarettes     Quit date: 2020     Years since quittin.0     Smokeless tobacco: Never     Tobacco comments:     e-cig   Substance and Sexual Activity     Alcohol use: Not Currently     Drug use: Not on file     Sexual activity: Yes     Partners: Female   Other Topics Concern     Not on file   Social History Narrative     Not on file     Social Determinants of Health     Financial Resource Strain: Not on file   Food Insecurity: Not on file   Transportation Needs: Not on file   Physical Activity: Not on file   Stress: Not on file   Social Connections: Not on file   Intimate Partner Violence: Not on file   Housing Stability: Not on file         Physical Exam:  Vitals:    22 1111   BP: 129/81   Pulse: 86   Resp: 14   Temp: 97.4  F (36.3  C)   TempSrc: Oral   SpO2: 95%      Patient is awake, alert, no distress  Right knee shows a large effusion.  There is no joint line or bony tenderness or deformity.  There is laxity on Lachman testing and valgus stress testing of the medial collateral ligament.  There is no laxity on posterior drawer testing or valgus stress testing of the lateral collateral ligament.  Distal sensation motor function and pulses are normal    Results:    I reviewed the knee x-ray.  I do not see any definite fracture.  They may be a small  avulsion off the distal femur versus some bony changes from previous surgery        Jerardo Live MD

## 2022-12-30 NOTE — LETTER
December 30, 2022      Froy Hernández  [address] [address] [facility] [address]        To Whom It May Concern:    Froy Hernández was seen in our clinic today.  He has a right knee injury with possible ligament damage.  He should avoid any weightbearing on the right leg for the next 1 week and follow-up with orthopedic surgery      Sincerely,        CUCO LYONS MD

## 2023-01-02 ENCOUNTER — OFFICE VISIT (OUTPATIENT)
Dept: ORTHOPEDICS | Facility: CLINIC | Age: 28
End: 2023-01-02
Attending: EMERGENCY MEDICINE
Payer: OTHER GOVERNMENT

## 2023-01-02 DIAGNOSIS — S89.91XA KNEE INJURY, RIGHT, INITIAL ENCOUNTER: ICD-10-CM

## 2023-01-02 DIAGNOSIS — S83.501A SPRAIN OF CRUCIATE LIGAMENT OF RIGHT KNEE, INITIAL ENCOUNTER: Primary | ICD-10-CM

## 2023-01-02 PROCEDURE — 99203 OFFICE O/P NEW LOW 30 MIN: CPT | Performed by: FAMILY MEDICINE

## 2023-01-02 NOTE — PATIENT INSTRUCTIONS
1. Sprain of cruciate ligament of right knee, initial encounter    2. Knee injury, right, initial encounter      -Patient has acute knee pain, swelling and instability while playing basketball and landing awkwardly likely due to an ACL and meniscus tears  -Patient will get an MRI of the right knee for further evaluation  - Your MRI has been ordered. You may call [phone] to schedule over the phone. Please call my office 2 days after your MRI is complete to discuss results and next of treatment options.  -Patient may continue with crutches for balance and pain control but can bear weight as tolerated  -Patient may purchase an over-the-counter hinged knee brace on [employer] or at [employer] for pain control instability  -Patient will continue ibuprofen 800 mg 2-3 times a day as needed  -Patient will continue icing and elevating the knee  -Call direct clinic number [[phone]] at any time with questions or concerns.    Albert Yeo MD [facility]  [facility]

## 2023-01-02 NOTE — PROGRESS NOTES
ASSESSMENT & PLAN  Patient Instructions     1. Sprain of cruciate ligament of right knee, initial encounter    2. Knee injury, right, initial encounter      -Patient has acute knee pain, swelling and instability while playing basketball and landing awkwardly likely due to an ACL and meniscus tears  -Patient will get an MRI of the right knee for further evaluation  - Your MRI has been ordered. You may call [phone] to schedule over the phone. Please call my office 2 days after your MRI is complete to discuss results and next of treatment options.  -Patient may continue with crutches for balance and pain control but can bear weight as tolerated  -Patient may purchase an over-the-counter hinged knee brace on [employer] or at [employer] for pain control instability  -Patient will continue ibuprofen 800 mg 2-3 times a day as needed  -Patient will continue icing and elevating the knee  -Call direct clinic number [[phone]] at any time with questions or concerns.    Albert Yeo MD [facility]  [facility]          -----    SUBJECTIVE  Froy Hernández is a/an 27 year old male who is seen in consultation at the request of  Jerardo Live M.D. for evaluation of right knee pain. The patient is seen with their wife.    Onset: 12/29/22, 4 days ago. Patient describes injury while playing basketball. He reports he jumped and landed.  He felt a pop in the knee, and a sensation of his knee shifting- while his patella remained in place.  He was able to ambulate to his vehicle, but onset of swelling and pain 1 1/2 hours  Location of Pain: right knee pain, pain located to the medial and lateral aspect.  Rating of Pain at worst: 7/10  Rating of Pain Currently: 4/10  Worsened by: weightbearing, painful at nighttime getting comfortable.   Treatments tried: ice, Ibuprofen, knee immobilizer, crutches- NWB   Associated symptoms: swelling, sensation of  instability, mild sensation of grinding.   Orthopedic history: YES - prior ACL injuries.  Relevant surgical history: YES -Left 2010 (ACL and meniscus), Right ACL reconstruction 2017  Social history: social history: works in [employer] work    No past medical history on file.  Social History     Socioeconomic History     Marital status:    Tobacco Use     Smoking status: Former     Types: Cigarettes     Quit date: 2020     Years since quittin.1     Smokeless tobacco: Never     Tobacco comments:     e-cig   Substance and Sexual Activity     Alcohol use: Not Currently     Sexual activity: Yes     Partners: Female         Patient's past medical, surgical, social, and family histories were reviewed today and no changes are noted.    REVIEW OF SYSTEMS:  10 point ROS is negative other than symptoms noted above in HPI, Past Medical History or as stated below  Constitutional: NEGATIVE for fever, chills, change in weight  Skin: NEGATIVE for worrisome rashes, moles or lesions  GI/: NEGATIVE for bowel or bladder changes  Neuro: NEGATIVE for weakness, dizziness or paresthesias    OBJECTIVE:  There were no vitals taken for this visit.   General: healthy, alert and in no distress  HEENT: no scleral icterus or conjunctival erythema  Skin: no suspicious lesions or rash. No jaundice.  CV: no pedal edema  Resp: normal respiratory effort without conversational dyspnea   Psych: normal mood and affect  Gait: normal steady gait with appropriate coordination and balance  Neuro: Normal light sensory exam of lower extremity  MSK:  RIGHT KNEE  Inspection:    normal alignment  Palpation:    Tender about the lateral joint line and medial joint line. Remainder of bony and ligamentous landmarks are nontender.    Moderate effusion is present    Patellofemoral crepitus is Absent  Range of Motion:     50 extension to 900 flexion  Strength:    Quadriceps grossly intact    Extensor mechanism intact  Special Tests:     Positive: Lachman's, George's    Negative: patellar apprehension, MCL/valgus stress (0 & 30 deg), LCL/varus stress (0 & 30 deg), anterior drawer, posterior drawer    Independent visualization of the below image:  No results found for this or any previous visit (from the past 24 hour(s)).    EXAM: XR KNEE RIGHT 3 VIEWS  LOCATION: [facility]  DATE/TIME: 12/30/2022 11:46 AM     INDICATION:  Knee injury, right, initial encounter  COMPARISON: None.                                                                      IMPRESSION: Moderate-sized effusion. Prior ACL repair with graft placement. No fracture. No degenerative changes.        Albert Yeo MD [facility]

## 2023-01-02 NOTE — LETTER
1/2/2023         RE: Froy Hernández  [address] [address] [address]        Dear Colleague,    Thank you for referring your patient, Froy Hernández, to the [facility] [place]. Please see a copy of my visit note below.    ASSESSMENT & PLAN  Patient Instructions     1. Sprain of cruciate ligament of right knee, initial encounter    2. Knee injury, right, initial encounter      -Patient has acute knee pain, swelling and instability while playing basketball and landing awkwardly likely due to an ACL and meniscus tears  -Patient will get an MRI of the right knee for further evaluation  - Your MRI has been ordered. You may call [phone] to schedule over the phone. Please call my office 2 days after your MRI is complete to discuss results and next of treatment options.  -Patient may continue with crutches for balance and pain control but can bear weight as tolerated  -Patient may purchase an over-the-counter hinged knee brace on [employer] or at [employer] for pain control instability  -Patient will continue ibuprofen 800 mg 2-3 times a day as needed  -Patient will continue icing and elevating the knee  -Call direct clinic number [[phone]] at any time with questions or concerns.    Albert Yeo MD [facility]  [facility]          -----    SUBJECTIVE  Froy Hernández is a/an 27 year old male who is seen in consultation at the request of  Jerardo Live M.D. for evaluation of right knee pain. The patient is seen with their wife.    Onset: 12/29/22, 4 days ago. Patient describes injury while playing basketball. He reports he jumped and landed.  He felt a pop in the knee, and a sensation of his knee shifting- while his patella remained in place.  He was able to ambulate to his vehicle, but onset of swelling and pain 1 1/2 hours  Location of Pain: right knee pain, pain located  to the medial and lateral aspect.  Rating of Pain at worst: 7/10  Rating of Pain Currently: 4/10  Worsened by: weightbearing, painful at nighttime getting comfortable.   Treatments tried: ice, Ibuprofen, knee immobilizer, crutches- NWB   Associated symptoms: swelling, sensation of instability, mild sensation of grinding.   Orthopedic history: YES - prior ACL injuries.  Relevant surgical history: YES -Left 2010 (ACL and meniscus), Right ACL reconstruction   Social history: social history: works in [employer] work    No past medical history on file.  Social History     Socioeconomic History     Marital status:    Tobacco Use     Smoking status: Former     Types: Cigarettes     Quit date: 2020     Years since quittin.1     Smokeless tobacco: Never     Tobacco comments:     e-cig   Substance and Sexual Activity     Alcohol use: Not Currently     Sexual activity: Yes     Partners: Female         Patient's past medical, surgical, social, and family histories were reviewed today and no changes are noted.    REVIEW OF SYSTEMS:  10 point ROS is negative other than symptoms noted above in HPI, Past Medical History or as stated below  Constitutional: NEGATIVE for fever, chills, change in weight  Skin: NEGATIVE for worrisome rashes, moles or lesions  GI/: NEGATIVE for bowel or bladder changes  Neuro: NEGATIVE for weakness, dizziness or paresthesias    OBJECTIVE:  There were no vitals taken for this visit.   General: healthy, alert and in no distress  HEENT: no scleral icterus or conjunctival erythema  Skin: no suspicious lesions or rash. No jaundice.  CV: no pedal edema  Resp: normal respiratory effort without conversational dyspnea   Psych: normal mood and affect  Gait: normal steady gait with appropriate coordination and balance  Neuro: Normal light sensory exam of lower extremity  MSK:  RIGHT KNEE  Inspection:    normal alignment  Palpation:    Tender about the lateral joint line and medial  joint line. Remainder of bony and ligamentous landmarks are nontender.    Moderate effusion is present    Patellofemoral crepitus is Absent  Range of Motion:     50 extension to 900 flexion  Strength:    Quadriceps grossly intact    Extensor mechanism intact  Special Tests:    Positive: Lachman's, George's    Negative: patellar apprehension, MCL/valgus stress (0 & 30 deg), LCL/varus stress (0 & 30 deg), anterior drawer, posterior drawer    Independent visualization of the below image:  No results found for this or any previous visit (from the past 24 hour(s)).    EXAM: XR KNEE RIGHT 3 VIEWS  LOCATION: [facility]  DATE/TIME: 12/30/2022 11:46 AM     INDICATION:  Knee injury, right, initial encounter  COMPARISON: None.                                                                      IMPRESSION: Moderate-sized effusion. Prior ACL repair with graft placement. No fracture. No degenerative changes.        Albert Yeo MD [facility]        Again, thank you for allowing me to participate in the care of your patient.        Sincerely,        Albert Yeo, MD

## 2023-01-09 ENCOUNTER — HOSPITAL ENCOUNTER (OUTPATIENT)
Dept: MRI IMAGING | Facility: CLINIC | Age: 28
Discharge: HOME | End: 2023-01-09
Attending: FAMILY MEDICINE | Admitting: FAMILY MEDICINE
Payer: OTHER GOVERNMENT

## 2023-01-09 ENCOUNTER — VIRTUAL VISIT (OUTPATIENT)
Dept: ORTHOPEDICS | Facility: CLINIC | Age: 28
End: 2023-01-09
Payer: OTHER GOVERNMENT

## 2023-01-09 DIAGNOSIS — S83.501A SPRAIN OF CRUCIATE LIGAMENT OF RIGHT KNEE, INITIAL ENCOUNTER: ICD-10-CM

## 2023-01-09 DIAGNOSIS — T84.89XA TEAR OF ANTERIOR CRUCIATE LIGAMENT GRAFT, INITIAL ENCOUNTER (H): ICD-10-CM

## 2023-01-09 DIAGNOSIS — S89.91XA KNEE INJURY, RIGHT, INITIAL ENCOUNTER: ICD-10-CM

## 2023-01-09 DIAGNOSIS — S83.501D SPRAIN OF CRUCIATE LIGAMENT OF RIGHT KNEE, SUBSEQUENT ENCOUNTER: Primary | ICD-10-CM

## 2023-01-09 PROCEDURE — 73721 MRI JNT OF LWR EXTRE W/O DYE: CPT | Mod: RT

## 2023-01-09 PROCEDURE — 99442 PR PHYSICIAN TELEPHONE EVALUATION 11-20 MIN: CPT | Mod: TEL | Performed by: FAMILY MEDICINE

## 2023-01-09 NOTE — LETTER
1/9/2023         RE: Froy Hernández  [address] [address] [address]        Dear Colleague,    Thank you for referring your patient, Froy Hernández, to the [facility] [place]. Please see a copy of my visit note below.    ASSESSMENT & PLAN  Patient Instructions     1. Sprain of cruciate ligament of right knee, subsequent encounter      -Patient following up for acute right knee pain and swelling due to a retear of his ACL graft\  -MRI results of the right knee were reviewed today which showed a full-thickness tear of his ACL graft, degeneration of the lateral meniscus and a moderate effusion.  -Due to patient's young age and activity level, recommend surgical intervention for long-term stability and function of this knee.  Patient agrees and would like to move forward with surgery.  -Patient will be referred to Dr. Alberto Carrillo at [facility] for surgical consultation.  -Call direct clinic number [[phone]] at any time with questions or concerns.    Albert Yeo MD [facility] and [facility] Medicine  [facility] [place]          -----  Froy is a 27 year old who is being evaluated via a billable telephone visit.      What phone number would you like to be contacted at? [phone]  How would you like to obtain your AVS? MyChart    Distant Location (provider location):  On-site  Phone call duration: 14 minutes    SUBJECTIVE:  Froy Hernández is a 27 year old male who is seen in follow-up for right knee injury.They were last seen 1/2/2023    Since their last visit reports 70% improvement in swelling, but notes pain overall still feels the same. Pain worse after activity (grocery shopping) and trying to sleep at night. They indicate that their current pain level is 5/10. They have tried ice, ibuprofen, previous imaging (MRI right knee), crutches and OTC hinged knee brace he is using. Takes Ibuprofen for  pain    The patient is seen by themselves.    Patient's past medical, surgical, social, and family histories were reviewed today and no changes are noted.    REVIEW OF SYSTEMS:  Constitutional: NEGATIVE for fever, chills, change in weight  Skin: NEGATIVE for worrisome rashes, moles or lesions  GI/: NEGATIVE for bowel or bladder changes  Neuro: NEGATIVE for weakness, dizziness or paresthesias      Phone visit  Independent visualization of the below image:    Results for orders placed or performed during the hospital encounter of 01/09/23   MR Knee Right w/o Contrast    Narrative    EXAM: MR KNEE RIGHT W/O CONTRAST  LOCATION: [facility]  DATE/TIME: 1/9/2023 8:08 AM    INDICATION: 27-year-old patient with right-sided knee pain. Clinical concern for internal derangement. Previous history of ACL reconstruction. MRI in further evaluation.  COMPARISON: 12/30/2022 radiographs.  TECHNIQUE: Unenhanced.    FINDINGS:    MEDIAL COMPARTMENT:   -Meniscus: Normal.  -Cartilage: Normal.    LATERAL COMPARTMENT:  -Meniscus: Small focus of increased intrasubstance signal in the posterior horn, without discrete extension through the femoral or tibial surfaces. The body and anterior horn are intact.   -Cartilage: Normal.    PATELLOFEMORAL COMPARTMENT:   -Alignment: Patella midline. No subluxation or tilting.   -Cartilage: Normal.    CRUCIATE LIGAMENTS:   -ACL: Previous anterior cruciate ligament reconstruction. No edematous or cystic changes in the femoral or tibial tunnels. Full-thickness tear of the mid substance of the graft with increased signal and architectural distortion in this region.  -PCL: Normal.    COLLATERAL LIGAMENTS:   -Medial collateral ligament: Superficial and deep fibers are normal.  -Lateral collateral ligament: Normal.    POSTEROMEDIAL CORNER:  -Distal semimembranosus tendon is normal.   -Pes anserine tendons are normal. Posteromedial corner complex ligaments are intact.    POSTEROLATERAL  CORNER:   -Popliteal tendon is intact. No tendinopathy.  -Biceps femoris tendon and posterolateral corner complex ligaments are intact.    EXTENSOR MECHANISM:   -Quadriceps tendon: Normal.  -Patellar tendon: Intact. Mild thickening of the medial fibers, suggesting previous graft harvest.  -Patellofemoral ligaments and retinacula: Intact.    JOINT:   -Moderate-sized knee joint effusion.  -Mild scarring in Hoffa's fat pad.    BONES:  -Edema-type signal in the posterolateral tibial plateau.  -No low signal intensity fracture line.  -No marrow replacing lesion.    SOFT TISSUES:   -Tiny popliteal fossa Baker's cyst.       Impression    IMPRESSION:  1.  Previous anterior cruciate ligament (ACL) reconstruction.  2.  Full-thickness tear of the ACL reconstruction graft.  3.  Degeneration or contusion of the lateral meniscus posterior horn. No discrete meniscus tear.  4.  Osseous contusion in the posterolateral tibial plateau.  5.  The knee cartilage surfaces are preserved.  6.  Moderate-sized knee joint effusion.       Patient's conditions were thoroughly discussed during today's visit with total time spent face-to-face with the patient and documentation being 16 minutes.    Albert Yeo MD, [facility]            Again, thank you for allowing me to participate in the care of your patient.        Sincerely,        Albert Yeo, MD

## 2023-01-09 NOTE — PATIENT INSTRUCTIONS
1. Sprain of cruciate ligament of right knee, subsequent encounter      -Patient following up for acute right knee pain and swelling due to a retear of his ACL graft\  -MRI results of the right knee were reviewed today which showed a full-thickness tear of his ACL graft, degeneration of the lateral meniscus and a moderate effusion.  -Due to patient's young age and activity level, recommend surgical intervention for long-term stability and function of this knee.  Patient agrees and would like to move forward with surgery.  -Patient will be referred to Dr. Alberto Carrillo at [facility] for surgical consultation.  -Call direct clinic number [[phone]] at any time with questions or concerns.    Albert Yeo MD [facility] Orthopedics and Sports Medicine  [facility] [place]

## 2023-01-09 NOTE — PROGRESS NOTES
ASSESSMENT & PLAN  Patient Instructions     1. Sprain of cruciate ligament of right knee, subsequent encounter      -Patient following up for acute right knee pain and swelling due to a retear of his ACL graft\  -MRI results of the right knee were reviewed today which showed a full-thickness tear of his ACL graft, degeneration of the lateral meniscus and a moderate effusion.  -Due to patient's young age and activity level, recommend surgical intervention for long-term stability and function of this knee.  Patient agrees and would like to move forward with surgery.  -Patient will be referred to Dr. Alberto Carrillo at [facility] for surgical consultation.  -Call direct clinic number [[phone]] at any time with questions or concerns.    Albert Yeo MD [facility] Orthopedics and Sports Medicine  [facility]          -----  Froy is a 27 year old who is being evaluated via a billable telephone visit.      What phone number would you like to be contacted at? [phone]  How would you like to obtain your AVS? MyChart    Distant Location (provider location):  On-site  Phone call duration: 14 minutes    SUBJECTIVE:  Froy Hernández is a 27 year old male who is seen in follow-up for right knee injury.They were last seen 1/2/2023    Since their last visit reports 70% improvement in swelling, but notes pain overall still feels the same. Pain worse after activity (grocery shopping) and trying to sleep at night. They indicate that their current pain level is 5/10. They have tried ice, ibuprofen, previous imaging (MRI right knee), crutches and OTC hinged knee brace he is using. Takes Ibuprofen for pain    The patient is seen by themselves.    Patient's past medical, surgical, social, and family histories were reviewed today and no changes are noted.    REVIEW OF SYSTEMS:  Constitutional: NEGATIVE for fever, chills, change in weight  Skin: NEGATIVE for worrisome rashes, moles or  lesions  GI/: NEGATIVE for bowel or bladder changes  Neuro: NEGATIVE for weakness, dizziness or paresthesias      Phone visit  Independent visualization of the below image:    Results for orders placed or performed during the hospital encounter of 01/09/23   MR Knee Right w/o Contrast    Narrative    EXAM: MR KNEE RIGHT W/O CONTRAST  LOCATION: [facility]  DATE/TIME: 1/9/2023 8:08 AM    INDICATION: 27-year-old patient with right-sided knee pain. Clinical concern for internal derangement. Previous history of ACL reconstruction. MRI in further evaluation.  COMPARISON: 12/30/2022 radiographs.  TECHNIQUE: Unenhanced.    FINDINGS:    MEDIAL COMPARTMENT:   -Meniscus: Normal.  -Cartilage: Normal.    LATERAL COMPARTMENT:  -Meniscus: Small focus of increased intrasubstance signal in the posterior horn, without discrete extension through the femoral or tibial surfaces. The body and anterior horn are intact.   -Cartilage: Normal.    PATELLOFEMORAL COMPARTMENT:   -Alignment: Patella midline. No subluxation or tilting.   -Cartilage: Normal.    CRUCIATE LIGAMENTS:   -ACL: Previous anterior cruciate ligament reconstruction. No edematous or cystic changes in the femoral or tibial tunnels. Full-thickness tear of the mid substance of the graft with increased signal and architectural distortion in this region.  -PCL: Normal.    COLLATERAL LIGAMENTS:   -Medial collateral ligament: Superficial and deep fibers are normal.  -Lateral collateral ligament: Normal.    POSTEROMEDIAL CORNER:  -Distal semimembranosus tendon is normal.   -Pes anserine tendons are normal. Posteromedial corner complex ligaments are intact.    POSTEROLATERAL CORNER:   -Popliteal tendon is intact. No tendinopathy.  -Biceps femoris tendon and posterolateral corner complex ligaments are intact.    EXTENSOR MECHANISM:   -Quadriceps tendon: Normal.  -Patellar tendon: Intact. Mild thickening of the medial fibers, suggesting previous graft  harvest.  -Patellofemoral ligaments and retinacula: Intact.    JOINT:   -Moderate-sized knee joint effusion.  -Mild scarring in Hoffa's fat pad.    BONES:  -Edema-type signal in the posterolateral tibial plateau.  -No low signal intensity fracture line.  -No marrow replacing lesion.    SOFT TISSUES:   -Tiny popliteal fossa Baker's cyst.       Impression    IMPRESSION:  1.  Previous anterior cruciate ligament (ACL) reconstruction.  2.  Full-thickness tear of the ACL reconstruction graft.  3.  Degeneration or contusion of the lateral meniscus posterior horn. No discrete meniscus tear.  4.  Osseous contusion in the posterolateral tibial plateau.  5.  The knee cartilage surfaces are preserved.  6.  Moderate-sized knee joint effusion.       Patient's conditions were thoroughly discussed during today's visit with total time spent face-to-face with the patient and documentation being 16 minutes.    Albert Yeo MD, [facility] Orthopedic Care

## 2023-01-13 NOTE — TELEPHONE ENCOUNTER
DIAGNOSIS: Tear of anterior cruciate ligament graft, ACL reconstruction / Dr. Yeo / MRI, XR /    APPOINTMENT DATE: 1.18.23   NOTES STATUS DETAILS   OFFICE NOTE from referring provider Internal 1.9.23 Yeo, Albert, MD  1.2.23 1.2.23 Jerardo Live MD  12.30.22     MEDICATION LIST Internal    LABS     CBC/DIFF Internal 12.3.20   MRI Internal 1.9.23 R knee   XRAYS (IMAGES & REPORTS) Internal 12.30.22 R knee

## 2023-01-18 ENCOUNTER — PRE VISIT (OUTPATIENT)
Dept: ORTHOPEDICS | Facility: CLINIC | Age: 28
End: 2023-01-18

## 2023-01-18 ENCOUNTER — OFFICE VISIT (OUTPATIENT)
Dept: ORTHOPEDICS | Facility: CLINIC | Age: 28
End: 2023-01-18
Attending: FAMILY MEDICINE
Payer: OTHER GOVERNMENT

## 2023-01-18 VITALS — WEIGHT: 222 LBS | HEIGHT: 70 IN | BODY MASS INDEX: 31.78 KG/M2

## 2023-01-18 DIAGNOSIS — T84.89XA TEAR OF ANTERIOR CRUCIATE LIGAMENT GRAFT, INITIAL ENCOUNTER (H): ICD-10-CM

## 2023-01-18 PROCEDURE — 99204 OFFICE O/P NEW MOD 45 MIN: CPT | Mod: GC | Performed by: ORTHOPAEDIC SURGERY

## 2023-01-18 ASSESSMENT — ENCOUNTER SYMPTOMS
ARTHRALGIAS: 1
STIFFNESS: 1
BACK PAIN: 0
MUSCLE WEAKNESS: 0
MYALGIAS: 0
NECK PAIN: 0
MUSCLE CRAMPS: 0
JOINT SWELLING: 1

## 2023-01-18 NOTE — LETTER
1/18/2023         RE: Froy Hernández  [address] [address] [facility] [address]        Dear Colleague,    Thank you for referring your patient, Froy Hernández, to the [facility] [place]. Please see a copy of my visit note below.    CHIEF CONCERN: R knee ACL re-tear    HISTORY:   27-year-old healthy male with history of bilateral ACL reconstructions, right side in 2017 in [facility] with hamstring autograft, presented clinic today for an ACL retear of the right knee sustained 12/29/2022 while playing basketball.    Of note, does use tobacco products, works as an [employer]  here locally.    PAST MEDICAL HISTORY: (Reviewed with the patient and in the [facility] medical record)    PAST SURGICAL HISTORY: (Reviewed with the patient and in the [facility] medical record)  L ACL recon  R ACL recon 2017 [facility] w/ hamstring autograft    MEDICATIONS: (Reviewed with the patient and in the [facility] medical record)    ALLERGIES: (Reviewed with the patient and in the [facility] medical record)  1. [facility]    SOCIAL HISTORY: (Reviewed with the patient and in the medical record)  --Tobacco: uses tobacco products  --Occupation: [employer]   --Avocation/Sport: previously played recreational basketball, participates in [employer] PT.    FAMILY HISTORY: (Reviewed with the patient and in the medical record)  -- No family history of bleeding, clotting, or difficulty with anesthesia    REVIEW OF SYSTEMS: (Reviewed with the patient and on the health intake form)  -- A comprehensive 10 point review of systems was conducted and is negative except as noted in the HPI    EXAM:     General: Awake, Alert and Oriented, No acute Distress. Articulate and Interactive    Body mass index is 31.85 kg/m .    Right Lower extremity :    Skin is Warm and Well perfused, no suggestion of infection    Incisions from prior surgery well-appearing    ROM 0-135 approx    Guarding on exam, equivocal Lachman and pivot  shift    Stable to varus/valgus/posterior drawer    Able to SLR    EHL/FHL/TA/GS 5/5    Sensation intact L3-S1    2+ Dorsalis Pedis Pulse    IMAGING:    Radiographs of the R knee from 12/30 were independently reviewed by me and findings were discussed with the patient today. The imaging demonstrates prior ACL surgery w/ tibial tunnel and an endobutton on the lateral femur.    MRI of the R knee from 1/9/2022 were independently reviewed by me and findings were discussed with the patient today. The imaging demonstrates tear of the ACL. Also possible medial meniscus tear    ASSESSMENT:  27M, healthy, w/ prior R ACL recon in 2017 w/ hamstring auto, now w/ re-tear 12/292022 while playing basketball.    Long discussion with the patient today.  We discussed with him and his wife that it is our recommendation to proceed with a revision ACL reconstruction for the long-term health of his knee in a young healthy active individual who is currently in the .  Discussed his bone tunnels to appear amenable to a single stage procedure, though we did discuss there is a very small chance it may have to be done in 2 stages.  Discussed using bone tendon bone autograft given its bone to bone healing and lower risk of rerupture.  The patient and his wife are in agreement with this plan and wished to proceed with surgery soon as possible.    PLAN:  1. Will schedule for R knee EUA, scope, revision ACL recon w/ BTB autograft, and possible medial meniscus repair vs meniscectomy.    Seen and d/w Dr. Carrillo.    Vijay Champagne MD  Orthopaedic Surgery, PGY-5  Pager: [phone]        Answers for HPI/ROS submitted by the patient on 1/18/2023  General Symptoms: No  Skin Symptoms: No  HENT Symptoms: No  EYE SYMPTOMS: No  HEART SYMPTOMS: No  LUNG SYMPTOMS: No  INTESTINAL SYMPTOMS: No  URINARY SYMPTOMS: No  REPRODUCTIVE SYMPTOMS: No  SKELETAL SYMPTOMS: Yes  BLOOD SYMPTOMS: No  NERVOUS SYSTEM SYMPTOMS: No  MENTAL HEALTH SYMPTOMS: No  Back pain:  No  Muscle aches: No  Neck pain: No  Swollen joints: Yes  Joint pain: Yes  Bone pain: Yes  Muscle cramps: No  Muscle weakness: No  Joint stiffness: Yes  Bone fracture: No        Patient seen and examined with the resident. I also personally reviewed the images and interpreted the imaging myself.     Assesment: tear of ACL graft right knee    ? Medial meniscus tear / medial meniscus capsular separation    Plan: offered revision acl reconstruction btb autograft, medial meniscus repair, eval of lateral meniscus.Very likely to do this in a single stage, though I did discuss 2 stage reconstruction as well    I discussed with the patient the risks, benefits, complications and techniques of surgery as well as the natural history of acl and meniscus tears and the alternative treatment options.    The risks include, but are not limited to the risk of death and risk of a myocardial infarction, risk of bleeding and a risk of infection, risk of nerve damage and a risk of muscle damage, stiffness, instability, continued pain or worsening pain and need for future surgery.    The patient was provided an opportunity to ask questions and these were answered.    I agree with history, physical and imaging as well as the assessment and plan as detailed by Dr. Champagne.         Alberto Carrillo MD

## 2023-01-18 NOTE — PROGRESS NOTES
Patient seen and examined with the resident. I also personally reviewed the images and interpreted the imaging myself.     Assesment: tear of ACL graft right knee    ? Medial meniscus tear / medial meniscus capsular separation    Plan: offered revision acl reconstruction btb autograft, medial meniscus repair, eval of lateral meniscus.Very likely to do this in a single stage, though I did discuss 2 stage reconstruction as well    I discussed with the patient the risks, benefits, complications and techniques of surgery as well as the natural history of acl and meniscus tears and the alternative treatment options.    The risks include, but are not limited to the risk of death and risk of a myocardial infarction, risk of bleeding and a risk of infection, risk of nerve damage and a risk of muscle damage, stiffness, instability, continued pain or worsening pain and need for future surgery.    The patient was provided an opportunity to ask questions and these were answered.    I agree with history, physical and imaging as well as the assessment and plan as detailed by Dr. Champagne.

## 2023-01-18 NOTE — PROGRESS NOTES
CHIEF CONCERN: R knee ACL re-tear    HISTORY:   27-year-old healthy male with history of bilateral ACL reconstructions, right side in 2017 in [facility] with hamstring autograft, presented clinic today for an ACL retear of the right knee sustained 12/29/2022 while playing basketball.    Of note, does use tobacco products, works as an [employer]  here locally.    PAST MEDICAL HISTORY: (Reviewed with the patient and in the EPIC medical record)    PAST SURGICAL HISTORY: (Reviewed with the patient and in the EPIC medical record)  L ACL recon  R ACL recon 2017 [facility] w/ hamstring autograft    MEDICATIONS: (Reviewed with the patient and in the EPIC medical record)    ALLERGIES: (Reviewed with the patient and in the EPIC medical record)  1. [facility]    SOCIAL HISTORY: (Reviewed with the patient and in the medical record)  --Tobacco: uses tobacco products  --Occupation: [employer]   --Avocation/Sport: previously played recreational basketball, participates in [employer] PT.    FAMILY HISTORY: (Reviewed with the patient and in the medical record)  -- No family history of bleeding, clotting, or difficulty with anesthesia    REVIEW OF SYSTEMS: (Reviewed with the patient and on the health intake form)  -- A comprehensive 10 point review of systems was conducted and is negative except as noted in the HPI    EXAM:     General: Awake, Alert and Oriented, No acute Distress. Articulate and Interactive    Body mass index is 31.85 kg/m .    Right Lower extremity :    Skin is Warm and Well perfused, no suggestion of infection    Incisions from prior surgery well-appearing    ROM 0-135 approx    Guarding on exam, equivocal Lachman and pivot shift    Stable to varus/valgus/posterior drawer    Able to SLR    EHL/FHL/TA/GS 5/5    Sensation intact L3-S1    2+ Dorsalis Pedis Pulse    IMAGING:    Radiographs of the R knee from 12/30 were independently reviewed by me and findings were discussed with the patient today. The imaging  demonstrates prior ACL surgery w/ tibial tunnel and an endobutton on the lateral femur.    MRI of the R knee from 1/9/2022 were independently reviewed by me and findings were discussed with the patient today. The imaging demonstrates tear of the ACL. Also possible medial meniscus tear    ASSESSMENT:  27M, healthy, w/ prior R ACL recon in 2017 w/ hamstring auto, now w/ re-tear 12/292022 while playing basketball.    Long discussion with the patient today.  We discussed with him and his wife that it is our recommendation to proceed with a revision ACL reconstruction for the long-term health of his knee in a young healthy active individual who is currently in the .  Discussed his bone tunnels to appear amenable to a single stage procedure, though we did discuss there is a very small chance it may have to be done in 2 stages.  Discussed using bone tendon bone autograft given its bone to bone healing and lower risk of rerupture.  The patient and his wife are in agreement with this plan and wished to proceed with surgery soon as possible.    PLAN:  1. Will schedule for R knee EUA, scope, revision ACL recon w/ BTB autograft, and possible medial meniscus repair vs meniscectomy.    Seen and d/w Dr. Carrillo.    Vijay Champagne MD  Orthopaedic Surgery, PGY-5  Pager: [phone]        Answers for HPI/ROS submitted by the patient on 1/18/2023  General Symptoms: No  Skin Symptoms: No  HENT Symptoms: No  EYE SYMPTOMS: No  HEART SYMPTOMS: No  LUNG SYMPTOMS: No  INTESTINAL SYMPTOMS: No  URINARY SYMPTOMS: No  REPRODUCTIVE SYMPTOMS: No  SKELETAL SYMPTOMS: Yes  BLOOD SYMPTOMS: No  NERVOUS SYSTEM SYMPTOMS: No  MENTAL HEALTH SYMPTOMS: No  Back pain: No  Muscle aches: No  Neck pain: No  Swollen joints: Yes  Joint pain: Yes  Bone pain: Yes  Muscle cramps: No  Muscle weakness: No  Joint stiffness: Yes  Bone fracture: No

## 2023-01-19 ENCOUNTER — TELEPHONE (OUTPATIENT)
Dept: ORTHOPEDICS | Facility: CLINIC | Age: 28
End: 2023-01-19
Payer: OTHER GOVERNMENT

## 2023-01-19 PROBLEM — T84.89XA: Status: ACTIVE | Noted: 2023-01-19

## 2023-01-19 NOTE — NURSING NOTE
Teaching Flowsheet   Relevant Diagnosis: Right knee arthroscopy, revision ACL reconstruction BTB, meniscus surgery  Teaching Topic: Pre-operative care     Person(s) involved in teaching:   Patient and Wife     Motivation Level:  Asks Questions: Yes  Eager to Learn: Yes  Cooperative: Yes  Receptive (willing/able to accept information): Yes  Any cultural factors/Muslim beliefs that may influence understanding or compliance? No       Patient demonstrates understanding of the following:  Reason for the appointment, diagnosis and treatment plan: Yes  Knowledge of proper use of medications and conditions for which they are ordered (with special attention to potential side effects or drug interactions): Yes  Which situations necessitate calling provider and whom to contact: Yes       Teaching Concerns Addressed: Pre-operative care       Proper use and care of brace and crutches (medical equip, care aids, etc.): No, explain: will be provided at surgery  Nutritional needs and diet plan: Yes  Pain management techniques: Yes  Wound Care: Yes  How and/when to access community resources: Yes     Instructional Materials Used/Given: Pre operative instructions and surgical soap     Time spent with patient: 15 minutes.    **Patient is scheduled for surgery at the [facility] on 1/31/23. He understands that he will need a pre-operative physical within 30 days of surgery, he plans to do this with his PCP. He also understands that he will only need to covid test if he is symptomatic.**

## 2023-01-25 ENCOUNTER — OFFICE VISIT (OUTPATIENT)
Dept: INTERNAL MEDICINE | Facility: CLINIC | Age: 28
End: 2023-01-25
Payer: OTHER GOVERNMENT

## 2023-01-25 VITALS
RESPIRATION RATE: 16 BRPM | HEIGHT: 70 IN | BODY MASS INDEX: 33.58 KG/M2 | WEIGHT: 234.6 LBS | TEMPERATURE: 98.5 F | HEART RATE: 83 BPM | DIASTOLIC BLOOD PRESSURE: 80 MMHG | SYSTOLIC BLOOD PRESSURE: 124 MMHG | OXYGEN SATURATION: 96 %

## 2023-01-25 DIAGNOSIS — L30.9 ECZEMA, UNSPECIFIED TYPE: ICD-10-CM

## 2023-01-25 DIAGNOSIS — S83.511A CHRONIC RUPTURE OF ACL OF RIGHT KNEE: ICD-10-CM

## 2023-01-25 DIAGNOSIS — Z01.818 PREOPERATIVE EXAMINATION: Primary | ICD-10-CM

## 2023-01-25 PROBLEM — B07.9 VIRAL WARTS: Status: RESOLVED | Noted: 2021-09-27 | Resolved: 2023-01-25

## 2023-01-25 PROCEDURE — 99214 OFFICE O/P EST MOD 30 MIN: CPT | Performed by: INTERNAL MEDICINE

## 2023-01-25 RX ORDER — FLUOCINONIDE CREAM (EMULSIFIED BASE) 0.5 MG/G
CREAM TOPICAL
Qty: 60 G | Refills: 0 | Status: SHIPPED | OUTPATIENT
Start: 2023-01-25 | End: 2023-04-04

## 2023-01-25 NOTE — PROGRESS NOTES
[facility]  [address] [address]  Phone: [phone]  Fax: [phone]  Primary Provider: Bobby Myers  Pre-op Performing Provider: LOW GOSS      PREOPERATIVE EVALUATION:  Today's date: 1/25/2023    Froy Hernández is a 27 year old male who presents for a preoperative evaluation.    Surgical Information:  Surgery/Procedure: under anesthesia, knee arthroscopy, revision bone tendon bone autograft ACL reconstruction, meniscus surgery  Surgery Location: [facility]  Surgeon: Dr. Carrillo  Surgery Date: 1/31/23  Time of Surgery: 9:40 AM  Where patient plans to recover: At home with family  Fax number for surgical facility: Note does not need to be faxed, will be available electronically in Epic.    Type of Anesthesia Anticipated: to be determined    Assessment & Plan     The proposed surgical procedure is considered LOW risk.    Preoperative examination  Healthy 28 yo male with no chronic medical problems or daily medications, injured his right knee twice in the last 6 years. 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 He has eczema on his elbows and needs refill for fluocinonide. We discussed good skin moisturizing.  - fluocinonide emulsified base (LIDEX-E) 0.05 % external cream; Aoply to affected areas BID until clear         Patient Instructions   Hold all supplements, aspirin and NSAIDs for 7 days prior to surgery.    Follow your surgeon's direction on when to stop eating and drinking prior to surgery.     Your surgeon will be managing your pain after your surgery.               RECOMMENDATION:  APPROVAL GIVEN to proceed with proposed procedure, without further diagnostic evaluation.            Subjective     HPI related to upcoming procedure: see above    Preop Questions 1/25/2023   1. Have you ever had a heart  attack or stroke? No   2. Have you ever had surgery on your heart or blood vessels, such as a stent placement, a coronary artery bypass, or surgery on an artery in your head, neck, heart, or legs? No   3. Do you have chest pain with activity? No   4. Do you have a history of  heart failure? No   5. Do you currently have a cold, bronchitis or symptoms of other infection? No   6. Do you have a cough, shortness of breath, or wheezing? No   7. Do you or anyone in your family have previous history of blood clots? No   8. Do you or does anyone in your family have a serious bleeding problem such as prolonged bleeding following surgeries or cuts? No   9. Have you ever had problems with anemia or been told to take iron pills? No   10. Have you had any abnormal blood loss such as black, tarry or bloody stools? No   11. Have you ever had a blood transfusion? No   12. Are you willing to have a blood transfusion if it is medically needed before, during, or after your surgery? Yes   13. Have you or any of your relatives ever had problems with anesthesia? No   14. Do you have sleep apnea, excessive snoring or daytime drowsiness? No   15. Do you have any artifical heart valves or other implanted medical devices like a pacemaker, defibrillator, or continuous glucose monitor? No   16. Do you have artificial joints? No   17. Are you allergic to latex? No       Health Care Directive:  Patient does not have a Health Care Directive or Living Will:     Preoperative Review of :            Review of Systems  CONSTITUTIONAL: NEGATIVE for fever, chills, change in weight  INTEGUMENTARY/SKIN: NEGATIVE for worrisome rashes, moles or lesions  EYES: NEGATIVE for vision changes or irritation  ENT/MOUTH: NEGATIVE for ear, mouth and throat problems  RESP: NEGATIVE for significant cough or SOB  CV: NEGATIVE for chest pain, palpitations or peripheral edema  GI: NEGATIVE for nausea, abdominal pain, heartburn, or change in bowel habits  : NEGATIVE  for frequency, dysuria, or hematuria  MUSCULOSKELETAL: NEGATIVE for significant arthralgias or myalgia  NEURO: NEGATIVE for weakness, dizziness or paresthesias  ENDOCRINE: NEGATIVE for temperature intolerance, skin/hair changes  HEME: NEGATIVE for bleeding problems  PSYCHIATRIC: NEGATIVE for changes in mood or affect    Patient Active Problem List    Diagnosis Date Noted     Tear of anterior cruciate ligament graft, initial encounter (H) 01/19/2023     Priority: Medium     Added automatically from request for surgery [id number]       Partial hamstring tear, initial encounter 09/22/2022     Priority: Medium     Capsulitis of left ankle 10/19/2021     Priority: Medium     Sinus tarsi syndrome, left 09/28/2021     Priority: Medium     Visual impairment 09/27/2021     Priority: Medium     Tear of meniscus of knee 09/27/2021     Priority: Medium     Nonspecific reaction to tuberculin test 09/27/2021     Priority: Medium     Drug resistant tuberculosis 09/27/2021     Priority: Medium     Closed fracture of middle or proximal phalanx or phalanges of hand 09/27/2021     Priority: Medium     Chronic post-traumatic headache 09/27/2021     Priority: Medium     Chronic allergic conjunctivitis 09/27/2021     Priority: Medium     Allergic rhinitis 09/27/2021     Priority: Medium     Pain in left ankle and joints of left foot 01/29/2019     Priority: Medium     Sprain of anterior cruciate ligament of right knee 10/24/2017     Priority: Medium     Other instability, left ankle 12/29/1899     Priority: Medium      No past medical history on file.  Past Surgical History:   Procedure Laterality Date     ARTHROSCOPY ANKLE       Current Outpatient Medications   Medication Sig Dispense Refill     fluocinonide emulsified base (LIDEX-E) 0.05 % external cream Aoply to affected areas BID until clear 60 g 0       Allergies   Allergen Reactions     Penicillins Unknown     Other reaction(s): *Unknown        Social History     Tobacco Use      "Smoking status: Some Days     Types: Cigarettes     Smokeless tobacco: Never     Tobacco comments:     e-cig   Substance Use Topics     Alcohol use: Not Currently       History   Drug Use Not on file         Objective     /80 (BP Location: Right arm, Patient Position: Sitting, Cuff Size: Adult Large)   Pulse 83   Temp 98.5  F (36.9  C) (Oral)   Resp 16   Ht 1.778 m (5' 10\")   Wt 106.4 kg (234 lb 9.6 oz)   SpO2 96%   BMI 33.66 kg/m      Physical Exam  Constitutional:  oriented to person, place, and time, appears well-nourished. No distress.   HENT:   Head: Normocephalic.   Mouth/Throat: Oropharynx is clear and moist.   Eyes: Conjunctivae are normal. Pupils are equal, round, and reactive to light.   Neck: Normal range of motion. Neck supple.   Cardiovascular: Normal rate, regular rhythm and normal heart sounds.    Pulmonary/Chest: Effort normal and breath sounds normal.   Abdominal: Soft. Bowel sounds are normal.   Musculoskeletal: Normal range of motion.   Neurological: alert and oriented to person, place, and time. Skin: Skin is warm.   Psychiatric: normal mood and affect.    No results for input(s): HGB, PLT, INR, NA, POTASSIUM, CR, A1C in the last 24870 hours.     Diagnostics:  No labs were ordered during this visit.   No EKG required, no history of coronary heart disease, significant arrhythmia, peripheral arterial disease or other structural heart disease.    Revised Cardiac Risk Index (RCRI):  The patient has the following serious cardiovascular risks for perioperative complications:   - No serious cardiac risks = 0 points     RCRI Interpretation: 0 points: Class I (very low risk - 0.4% complication rate)           Signed Electronically by: Corinne Manuel MD  Copy of this evaluation report is provided to requesting physician.      "

## 2023-01-25 NOTE — PATIENT INSTRUCTIONS
Hold all supplements, aspirin and NSAIDs for 7 days prior to surgery.    Follow your surgeon's direction on when to stop eating and drinking prior to surgery.     Your surgeon will be managing your pain after your surgery.

## 2023-01-30 ENCOUNTER — ANESTHESIA EVENT (OUTPATIENT)
Dept: SURGERY | Facility: AMBULATORY SURGERY CENTER | Age: 28
End: 2023-01-30
Payer: OTHER GOVERNMENT

## 2023-01-31 ENCOUNTER — HOSPITAL ENCOUNTER (OUTPATIENT)
Facility: AMBULATORY SURGERY CENTER | Age: 28
Discharge: HOME | End: 2023-01-31
Attending: ORTHOPAEDIC SURGERY
Payer: OTHER GOVERNMENT

## 2023-01-31 ENCOUNTER — ANESTHESIA (OUTPATIENT)
Dept: SURGERY | Facility: AMBULATORY SURGERY CENTER | Age: 28
End: 2023-01-31
Payer: OTHER GOVERNMENT

## 2023-01-31 VITALS
RESPIRATION RATE: 16 BRPM | BODY MASS INDEX: 32.93 KG/M2 | SYSTOLIC BLOOD PRESSURE: 122 MMHG | TEMPERATURE: 98 F | DIASTOLIC BLOOD PRESSURE: 68 MMHG | HEART RATE: 85 BPM | WEIGHT: 230 LBS | HEIGHT: 70 IN | OXYGEN SATURATION: 99 %

## 2023-01-31 DIAGNOSIS — T84.89XA TEAR OF ANTERIOR CRUCIATE LIGAMENT GRAFT, INITIAL ENCOUNTER (H): ICD-10-CM

## 2023-01-31 PROCEDURE — C1762 CONN TISS, HUMAN(INC FASCIA): HCPCS

## 2023-01-31 PROCEDURE — 29888 ARTHRS AID ACL RPR/AGMNTJ: CPT | Mod: RT

## 2023-01-31 PROCEDURE — C1713 ANCHOR/SCREW BN/BN,TIS/BN: HCPCS

## 2023-01-31 PROCEDURE — 29888 ARTHRS AID ACL RPR/AGMNTJ: CPT | Mod: RT | Performed by: ORTHOPAEDIC SURGERY

## 2023-01-31 PROCEDURE — C9290 INJ, BUPIVACAINE LIPOSOME: HCPCS

## 2023-01-31 DEVICE — Ø8X 20MM BC IF SCRW, VENTED
Type: IMPLANTABLE DEVICE | Site: KNEE | Status: FUNCTIONAL
Brand: ARTHREX®

## 2023-01-31 DEVICE — Ø7X 20MM BC IF SCRW, VENTED
Type: IMPLANTABLE DEVICE | Site: KNEE | Status: FUNCTIONAL
Brand: ARTHREX®

## 2023-01-31 RX ORDER — KETOROLAC TROMETHAMINE 30 MG/ML
INJECTION, SOLUTION INTRAMUSCULAR; INTRAVENOUS PRN
Status: DISCONTINUED | OUTPATIENT
Start: 2023-01-31 | End: 2023-01-31

## 2023-01-31 RX ORDER — BUPIVACAINE HYDROCHLORIDE AND EPINEPHRINE 2.5; 5 MG/ML; UG/ML
INJECTION, SOLUTION INFILTRATION; PERINEURAL PRN
Status: DISCONTINUED | OUTPATIENT
Start: 2023-01-31 | End: 2023-01-31 | Stop reason: HOSPADM

## 2023-01-31 RX ORDER — PROPOFOL 10 MG/ML
INJECTION, EMULSION INTRAVENOUS PRN
Status: DISCONTINUED | OUTPATIENT
Start: 2023-01-31 | End: 2023-01-31

## 2023-01-31 RX ORDER — NALOXONE HYDROCHLORIDE 0.4 MG/ML
0.2 INJECTION, SOLUTION INTRAMUSCULAR; INTRAVENOUS; SUBCUTANEOUS
Status: DISCONTINUED | OUTPATIENT
Start: 2023-01-31 | End: 2023-01-31 | Stop reason: HOSPADM

## 2023-01-31 RX ORDER — FENTANYL CITRATE 50 UG/ML
50 INJECTION, SOLUTION INTRAMUSCULAR; INTRAVENOUS EVERY 5 MIN PRN
Status: DISCONTINUED | OUTPATIENT
Start: 2023-01-31 | End: 2023-01-31 | Stop reason: HOSPADM

## 2023-01-31 RX ORDER — HYDROXYZINE HYDROCHLORIDE 25 MG/1
25 TABLET, FILM COATED ORAL 3 TIMES DAILY PRN
Qty: 30 TABLET | Refills: 0 | Status: SHIPPED | OUTPATIENT
Start: 2023-01-31 | End: 2023-04-04

## 2023-01-31 RX ORDER — SODIUM CHLORIDE, SODIUM LACTATE, POTASSIUM CHLORIDE, CALCIUM CHLORIDE 600; 310; 30; 20 MG/100ML; MG/100ML; MG/100ML; MG/100ML
INJECTION, SOLUTION INTRAVENOUS CONTINUOUS
Status: DISCONTINUED | OUTPATIENT
Start: 2023-01-31 | End: 2023-01-31 | Stop reason: HOSPADM

## 2023-01-31 RX ORDER — CEFAZOLIN SODIUM 2 G/50ML
2 SOLUTION INTRAVENOUS
Status: COMPLETED | OUTPATIENT
Start: 2023-01-31 | End: 2023-01-31

## 2023-01-31 RX ORDER — METHOCARBAMOL 750 MG/1
750 TABLET, FILM COATED ORAL
Status: DISCONTINUED | OUTPATIENT
Start: 2023-01-31 | End: 2023-02-01 | Stop reason: HOSPADM

## 2023-01-31 RX ORDER — PROPOFOL 10 MG/ML
INJECTION, EMULSION INTRAVENOUS CONTINUOUS PRN
Status: DISCONTINUED | OUTPATIENT
Start: 2023-01-31 | End: 2023-01-31

## 2023-01-31 RX ORDER — NALOXONE HYDROCHLORIDE 0.4 MG/ML
0.4 INJECTION, SOLUTION INTRAMUSCULAR; INTRAVENOUS; SUBCUTANEOUS
Status: DISCONTINUED | OUTPATIENT
Start: 2023-01-31 | End: 2023-01-31 | Stop reason: HOSPADM

## 2023-01-31 RX ORDER — LIDOCAINE 40 MG/G
CREAM TOPICAL
Status: DISCONTINUED | OUTPATIENT
Start: 2023-01-31 | End: 2023-01-31 | Stop reason: HOSPADM

## 2023-01-31 RX ORDER — FENTANYL CITRATE 50 UG/ML
INJECTION, SOLUTION INTRAMUSCULAR; INTRAVENOUS PRN
Status: DISCONTINUED | OUTPATIENT
Start: 2023-01-31 | End: 2023-01-31

## 2023-01-31 RX ORDER — OXYCODONE HYDROCHLORIDE 5 MG/1
5-10 TABLET ORAL EVERY 4 HOURS PRN
Qty: 20 TABLET | Refills: 0 | Status: SHIPPED | OUTPATIENT
Start: 2023-01-31 | End: 2023-02-01

## 2023-01-31 RX ORDER — FENTANYL CITRATE 50 UG/ML
25 INJECTION, SOLUTION INTRAMUSCULAR; INTRAVENOUS EVERY 5 MIN PRN
Status: DISCONTINUED | OUTPATIENT
Start: 2023-01-31 | End: 2023-01-31 | Stop reason: HOSPADM

## 2023-01-31 RX ORDER — CEFAZOLIN SODIUM 2 G/50ML
2 SOLUTION INTRAVENOUS SEE ADMIN INSTRUCTIONS
Status: DISCONTINUED | OUTPATIENT
Start: 2023-01-31 | End: 2023-01-31 | Stop reason: HOSPADM

## 2023-01-31 RX ORDER — ONDANSETRON 4 MG/1
4 TABLET, ORALLY DISINTEGRATING ORAL EVERY 30 MIN PRN
Status: DISCONTINUED | OUTPATIENT
Start: 2023-01-31 | End: 2023-01-31 | Stop reason: HOSPADM

## 2023-01-31 RX ORDER — AMOXICILLIN 250 MG
1-2 CAPSULE ORAL 2 TIMES DAILY
Qty: 30 TABLET | Refills: 0 | Status: SHIPPED | OUTPATIENT
Start: 2023-01-31 | End: 2023-04-04

## 2023-01-31 RX ORDER — OXYCODONE HYDROCHLORIDE 5 MG/1
5 TABLET ORAL
Status: COMPLETED | OUTPATIENT
Start: 2023-01-31 | End: 2023-01-31

## 2023-01-31 RX ORDER — ACETAMINOPHEN 325 MG/1
650 TABLET ORAL
Status: DISCONTINUED | OUTPATIENT
Start: 2023-01-31 | End: 2023-02-01 | Stop reason: HOSPADM

## 2023-01-31 RX ORDER — ACETAMINOPHEN 325 MG/1
975 TABLET ORAL ONCE
Status: DISCONTINUED | OUTPATIENT
Start: 2023-01-31 | End: 2023-01-31 | Stop reason: HOSPADM

## 2023-01-31 RX ORDER — FLUMAZENIL 0.1 MG/ML
0.2 INJECTION, SOLUTION INTRAVENOUS
Status: DISCONTINUED | OUTPATIENT
Start: 2023-01-31 | End: 2023-01-31 | Stop reason: HOSPADM

## 2023-01-31 RX ORDER — ONDANSETRON 4 MG/1
4 TABLET, ORALLY DISINTEGRATING ORAL
Status: DISCONTINUED | OUTPATIENT
Start: 2023-01-31 | End: 2023-02-01 | Stop reason: HOSPADM

## 2023-01-31 RX ORDER — ACETAMINOPHEN 325 MG/1
975 TABLET ORAL ONCE
Status: COMPLETED | OUTPATIENT
Start: 2023-01-31 | End: 2023-01-31

## 2023-01-31 RX ORDER — HYDROXYZINE HYDROCHLORIDE 25 MG/1
25 TABLET, FILM COATED ORAL
Status: DISCONTINUED | OUTPATIENT
Start: 2023-01-31 | End: 2023-02-01 | Stop reason: HOSPADM

## 2023-01-31 RX ORDER — ONDANSETRON 2 MG/ML
INJECTION INTRAMUSCULAR; INTRAVENOUS PRN
Status: DISCONTINUED | OUTPATIENT
Start: 2023-01-31 | End: 2023-01-31

## 2023-01-31 RX ORDER — HYDROMORPHONE HYDROCHLORIDE 1 MG/ML
0.4 INJECTION, SOLUTION INTRAMUSCULAR; INTRAVENOUS; SUBCUTANEOUS EVERY 5 MIN PRN
Status: DISCONTINUED | OUTPATIENT
Start: 2023-01-31 | End: 2023-01-31 | Stop reason: HOSPADM

## 2023-01-31 RX ORDER — ONDANSETRON 2 MG/ML
4 INJECTION INTRAMUSCULAR; INTRAVENOUS EVERY 30 MIN PRN
Status: DISCONTINUED | OUTPATIENT
Start: 2023-01-31 | End: 2023-01-31 | Stop reason: HOSPADM

## 2023-01-31 RX ORDER — BUPIVACAINE HYDROCHLORIDE 2.5 MG/ML
INJECTION, SOLUTION EPIDURAL; INFILTRATION; INTRACAUDAL
Status: COMPLETED | OUTPATIENT
Start: 2023-01-31 | End: 2023-01-31

## 2023-01-31 RX ORDER — HYDROMORPHONE HYDROCHLORIDE 1 MG/ML
0.2 INJECTION, SOLUTION INTRAMUSCULAR; INTRAVENOUS; SUBCUTANEOUS EVERY 5 MIN PRN
Status: DISCONTINUED | OUTPATIENT
Start: 2023-01-31 | End: 2023-01-31 | Stop reason: HOSPADM

## 2023-01-31 RX ORDER — METHOCARBAMOL 500 MG/1
500 TABLET, FILM COATED ORAL 4 TIMES DAILY PRN
Qty: 30 TABLET | Refills: 0 | Status: SHIPPED | OUTPATIENT
Start: 2023-01-31 | End: 2023-04-04

## 2023-01-31 RX ORDER — GLYCOPYRROLATE 0.2 MG/ML
INJECTION, SOLUTION INTRAMUSCULAR; INTRAVENOUS PRN
Status: DISCONTINUED | OUTPATIENT
Start: 2023-01-31 | End: 2023-01-31

## 2023-01-31 RX ORDER — ONDANSETRON 4 MG/1
4 TABLET, ORALLY DISINTEGRATING ORAL EVERY 8 HOURS PRN
Qty: 4 TABLET | Refills: 0 | Status: SHIPPED | OUTPATIENT
Start: 2023-01-31 | End: 2023-04-04

## 2023-01-31 RX ORDER — ACETAMINOPHEN 325 MG/1
650 TABLET ORAL EVERY 4 HOURS PRN
Qty: 50 TABLET | Refills: 0 | Status: SHIPPED | OUTPATIENT
Start: 2023-01-31 | End: 2023-04-04

## 2023-01-31 RX ORDER — FENTANYL CITRATE 50 UG/ML
25-50 INJECTION, SOLUTION INTRAMUSCULAR; INTRAVENOUS
Status: DISCONTINUED | OUTPATIENT
Start: 2023-01-31 | End: 2023-01-31 | Stop reason: HOSPADM

## 2023-01-31 RX ORDER — LIDOCAINE HYDROCHLORIDE 20 MG/ML
INJECTION, SOLUTION INFILTRATION; PERINEURAL PRN
Status: DISCONTINUED | OUTPATIENT
Start: 2023-01-31 | End: 2023-01-31

## 2023-01-31 RX ADMIN — Medication 0.5 MG: at 10:53

## 2023-01-31 RX ADMIN — PROPOFOL 100 MCG/KG/MIN: 10 INJECTION, EMULSION INTRAVENOUS at 09:53

## 2023-01-31 RX ADMIN — FENTANYL CITRATE 25 MCG: 50 INJECTION, SOLUTION INTRAMUSCULAR; INTRAVENOUS at 10:12

## 2023-01-31 RX ADMIN — LIDOCAINE HYDROCHLORIDE 100 MG: 20 INJECTION, SOLUTION INFILTRATION; PERINEURAL at 09:21

## 2023-01-31 RX ADMIN — FENTANYL CITRATE 50 MCG: 50 INJECTION, SOLUTION INTRAMUSCULAR; INTRAVENOUS at 09:37

## 2023-01-31 RX ADMIN — BUPIVACAINE HYDROCHLORIDE 10 ML: 2.5 INJECTION, SOLUTION EPIDURAL; INFILTRATION; INTRACAUDAL at 09:00

## 2023-01-31 RX ADMIN — FENTANYL CITRATE 50 MCG: 50 INJECTION, SOLUTION INTRAMUSCULAR; INTRAVENOUS at 09:01

## 2023-01-31 RX ADMIN — GLYCOPYRROLATE 0.2 MG: 0.2 INJECTION, SOLUTION INTRAMUSCULAR; INTRAVENOUS at 09:19

## 2023-01-31 RX ADMIN — PROPOFOL 150 MCG/KG/MIN: 10 INJECTION, EMULSION INTRAVENOUS at 09:21

## 2023-01-31 RX ADMIN — CEFAZOLIN SODIUM 2 G: 2 SOLUTION INTRAVENOUS at 09:10

## 2023-01-31 RX ADMIN — FENTANYL CITRATE 25 MCG: 50 INJECTION, SOLUTION INTRAMUSCULAR; INTRAVENOUS at 11:43

## 2023-01-31 RX ADMIN — KETOROLAC TROMETHAMINE 30 MG: 30 INJECTION, SOLUTION INTRAMUSCULAR; INTRAVENOUS at 10:55

## 2023-01-31 RX ADMIN — OXYCODONE HYDROCHLORIDE 5 MG: 5 TABLET ORAL at 11:36

## 2023-01-31 RX ADMIN — SODIUM CHLORIDE, SODIUM LACTATE, POTASSIUM CHLORIDE, CALCIUM CHLORIDE: 600; 310; 30; 20 INJECTION, SOLUTION INTRAVENOUS at 08:46

## 2023-01-31 RX ADMIN — Medication 0.5 MG: at 11:24

## 2023-01-31 RX ADMIN — ONDANSETRON 4 MG: 2 INJECTION INTRAMUSCULAR; INTRAVENOUS at 09:28

## 2023-01-31 RX ADMIN — ACETAMINOPHEN 975 MG: 325 TABLET ORAL at 08:32

## 2023-01-31 RX ADMIN — PROPOFOL 300 MG: 10 INJECTION, EMULSION INTRAVENOUS at 09:21

## 2023-01-31 RX ADMIN — FENTANYL CITRATE 25 MCG: 50 INJECTION, SOLUTION INTRAMUSCULAR; INTRAVENOUS at 10:05

## 2023-01-31 NOTE — ANESTHESIA PREPROCEDURE EVALUATION
Anesthesia Pre-Procedure Evaluation    Patient: Froy Hernández   MRN: [id number] : 1995        Procedure : Procedure(s):  Examination under anesthesia, knee arthroscopy, revision bone tendon bone autograft ACL reconstruction,  meniscus surgery          No past medical history on file.   Past Surgical History:   Procedure Laterality Date     ARTHROSCOPY ANKLE        Allergies   Allergen Reactions     Penicillins Unknown     Other reaction(s): *Unknown      Social History     Tobacco Use     Smoking status: Some Days     Types: Cigarettes     Smokeless tobacco: Never     Tobacco comments:     e-cig   Substance Use Topics     Alcohol use: Not Currently      Wt Readings from Last 1 Encounters:   23 104.3 kg (230 lb)        Anesthesia Evaluation   Pt has had prior anesthetic.     No history of anesthetic complications       ROS/MED HX  ENT/Pulmonary:  - neg pulmonary ROS     Neurologic:  - neg neurologic ROS     Cardiovascular:  - neg cardiovascular ROS     METS/Exercise Tolerance:     Hematologic:  - neg hematologic  ROS     Musculoskeletal: Comment: Right ACL tear      GI/Hepatic:  - neg GI/hepatic ROS     Renal/Genitourinary:  - neg Renal ROS     Endo:  - neg endo ROS     Psychiatric/Substance Use:  - neg psychiatric ROS     Infectious Disease:  - neg infectious disease ROS     Malignancy:  - neg malignancy ROS     Other:  - neg other ROS          Physical Exam    Airway  airway exam normal           Respiratory Devices and Support         Dental       (+) Completely normal teeth      Cardiovascular   cardiovascular exam normal          Pulmonary   pulmonary exam normal                OUTSIDE LABS:  CBC:   Lab Results   Component Value Date    WBC 5.0 2020    HGB 13.9 (L) 2020    HCT 40.5 2020     2020     BMP:   Lab Results   Component Value Date     2020    POTASSIUM 3.9 2020    CHLORIDE 103 2020    CO2 29 2020    BUN 15 2020     CR 0.92 12/03/2020     12/03/2020     COAGS: No results found for: PTT, INR, FIBR  POC: No results found for: BGM, HCG, HCGS  HEPATIC:   Lab Results   Component Value Date    ALBUMIN 4.3 12/03/2020    PROTTOTAL 7.2 12/03/2020     (H) 12/03/2020    AST 73 (H) 12/03/2020    ALKPHOS 95 12/03/2020    BILITOTAL 0.3 12/03/2020     OTHER:   Lab Results   Component Value Date    CALVIN 9.3 12/03/2020    LIPASE 26 12/03/2020       Anesthesia Plan    ASA Status:  1   NPO Status:  NPO Appropriate    Anesthesia Type: General.     - Airway: LMA   Induction: Intravenous, Propofol.   Maintenance: Balanced.        Consents    Anesthesia Plan(s) and associated risks, benefits, and realistic alternatives discussed. Questions answered and patient/representative(s) expressed understanding.     - Discussed: Risks, Benefits and Alternatives for BOTH SEDATION and the PROCEDURE were discussed     - Discussed with:  Patient      - Extended Intubation/Ventilatory Support Discussed: No.      - Patient is DNR/DNI Status: No    Use of blood products discussed: No .     Postoperative Care    Pain management: IV analgesics, Peripheral nerve block (Single Shot), Oral pain medications, Multi-modal analgesia.   PONV prophylaxis: Ondansetron (or other 5HT-3), Dexamethasone or Solumedrol, Background Propofol Infusion     Comments:                Brent Rodriguez MD

## 2023-01-31 NOTE — ANESTHESIA PROCEDURE NOTES
Adductor canal Procedure Note    Pre-Procedure   Staff -        Anesthesiologist:  Carlos Sosa MD       Resident/Fellow: Brent Rodriguez MD       Performed By: resident       Location: pre-op       Procedure Start/Stop Times: 1/31/2023 8:50 AM and 1/31/2023 9:00 AM       Pre-Anesthestic Checklist: patient identified, IV checked, site marked, risks and benefits discussed, informed consent, monitors and equipment checked, pre-op evaluation, at physician/surgeon's request and post-op pain management  Timeout:       Correct Patient: Yes        Correct Procedure: Yes        Correct Site: Yes        Correct Position: Yes        Correct Laterality: Yes        Site Marked: Yes  Procedure Documentation  Procedure: Adductor canal       Laterality: right       Patient Position: sitting       Skin prep: Chloraprep       Needle Type: short bevel       Needle Gauge: 17.        Needle Length (Inches): 3.13        Ultrasound guided       1. Ultrasound was used to identify targeted nerve, plexus, vascular marker, or fascial plane and place a needle adjacent to it in real-time.       2. Ultrasound was used to visualize the spread of anesthetic in close proximity to the above referenced structure.       3. A permanent image is entered into the patient's record.       4. The visualized anatomic structures appeared normal.       5. There were no apparent abnormal pathologic findings.    Assessment/Narrative         The placement was negative for: blood aspirated, painful injection and site bleeding       Paresthesias: No.       Bolus given via needle. no blood aspirated via catheter.        Secured via.        Insertion/Infusion Method: Single Shot       Complications: none    Medication(s) Administered   Bupivacaine 0.25% PF (Infiltration) - Infiltration   10 mL - 1/31/2023 9:00:00 AM  Bupivacaine liposome (Exparel) 1.3% LA inj susp (Infiltration) - Infiltration   10 mL - 1/31/2023 9:00:00 AM  Medication  "Administration Time: 1/31/2023 8:50 AM     Comments:  133mg Exparel injected      FOR [facility] ONLY:   Pain Team Contact information: please page the Pain Team Via DOCUSYS. Search \"Pain\". During daytime hours, please page the attending first. At night please page the resident first.    "

## 2023-01-31 NOTE — DISCHARGE INSTRUCTIONS
"[facility]  Home Care Following Anesthesia  For 24 hours after surgery:  Get plenty of rest.  A responsible adult must stay with you for at least 24 hours after you leave the surgery center.  Do not drive or use heavy equipment.  If you have weakness or tingling, don't drive or use heavy equipment until this feeling goes away.   Do not drink alcohol.   Avoid strenuous or risky activities.  Ask for help when climbing stairs.  You may feel lightheaded.  IF so, sit for a few minutes before standing.  Have someone help you get up.   If you have nausea (feel sick to your stomach): Drink only clear liquids such as apple juice, ginger ale, broth or 7-Up.  Rest may also help.  Be sure to drink enough fluids.  Move to a regular diet as you feel able.   You may have a slight fever.  Call the doctor if your fever is over 100 F (37.7 C) (taken under the tongue) or lasts longer than 24 hours.  You may have a dry mouth, a sore throat, muscle aches or trouble sleeping. These should go away after 24 hours.  Do not make important or legal decisions.   It is recommended to avoid smoking.        Today you received an Exparel block to numb the nerves near your surgery site.  This is a block using local anesthetic or \"numbing\" medication injected around the nerves to anesthetize or \"numb\" the area supplied by those nerves.  This block is injected into the muscle layer near your surgical site.  This medication may numb the location where you had surgery up to 72 hours.  If your surgical site is an arm or leg you should be careful with your affected limb, since it is possible to injure your limb without being aware of it due to the numbing.  Until full feeling returns, you should guard against bumping or hitting your limb, and avoid extreme hot or cold temperatures on the skin.  As the block wears off, the feeling will return as a tingling or prickly sensation near your surgical site.  You will " experince more discomfort from your incision as the feeling returns.  You may want to take a pain pill (a narcotic or Tylenol if this was prescribed by your surgeon) when you start to experience mild pain before the pain beomes more severe.  If your pain medications do not control your pain, you should notify your surgeon.    Tips for taking pain medications  To get the best pain relief possible, remember these points:  Take pain medications as directed, before pain becomes severe.  Pain medication can upset your stomach: taking it with food may help.  Constipation is a common side effect of pain medication. Drink plenty of  fluids.  Eat foods high in fiber. Take a stool softener if recommended by your doctor or pharmacist.  Do not drink alcohol, drive or operate machinery while taking pain medications.  Ask about other ways to control pain, such as with heat, ice or relaxation.    Tylenol/Acetaminophen Consumption  To help encourage the safe use of acetaminophen, the makers of TYLENOL  have lowered the maximum daily dose for single-ingredient Extra Strength TYLENOL  (acetaminophen) products sold in the [place] from 8 pills per day (4,000 mg) to 6 pills per day (3,000 mg). The dosing interval has also changed from 2 pills every 4-6 hours to 2 pills every 6 hours.  If you feel your pain relief is insufficient, you may take Tylenol/Acetaminophen in addition to your narcotic pain medication.   Be careful not to exceed 3,000 mg of Tylenol/Acetaminophen in a 24 hour period from all sources.  If you are taking extra strength Tylenol/acetaminophen (500 mg), the maximum dose is 6 tablets in 24 hours.  If you are taking regular strength acetaminophen (325 mg), the maximum dose is 9 tablets in 24 hours.    Call a doctor for any of the following:  Signs of infection (fever, growing tenderness at the surgery site, a large amount of drainage or bleeding, severe pain, foul-smelling drainage, redness, swelling).  It has been over 8  "to 10 hours since surgery and you are still not able to urinate (pass water).  Headache for over 24 hours.  Numbness, tingling or weakness the day after surgery (if you had spinal anesthesia).  Signs of Covid-19 infection (temperature over 100 degrees, shortness of breath, cough, loss of taste/smell, generalized body aches, persistent headache, chills, sore throat, nausea/vomiting/diarrhea)  Your doctor is:       Dr. Alberto Carrillo, Orthopaedics: [phone]               Or dial [phone] and ask for the resident on call for:  Orthopaedics  For emergency care, call the:  [facility] [facility] [phone] (TTY for hearing impaired: [phone])                    Safety Tips for Using Crutches    Crutch Fit:  Assume good standing posture with shoulders relaxed and crutch tips 6-8 inches out from the side of the foot.  The underarm pad should fall 2-3 fingers width below the armpit.  The handgrip is positioned level with the wrist to allow 30  flexion at the elbow.    Safety Tips:  Bear weight on your hands, not on your armpits.  Do not add extra padding to the underarm pad. This will, in effect, lengthen the crutches and increase risk of nerve injury.  Wear flat, properly fitting shoes. Do not walk in stocking feet, high heels or slippers.  Household hazards:  --Throw rugs should be removed from floors.  --Stairs should be cleared of obstacles.  --Use extra caution on slippery, highly polished, littered or uneven floor surfaces.  --Check for electric cords.  Check crutch tips for excessive wear and keep wing nuts tight.  While walking, look forward with  head up  and  eyes open.  Take equal length steps.  Use BOTH crutches.    Stairs Sequence:  UP: \"Good\" leg first, followed by  bad  leg, then crutches.  DOWN: Crutches, followed by  bad  leg, \"good\" leg.     Walking with Crutches:  Move both crutches forward at the same time.  Non-Weight Bearing (NWB):  Hold the involved leg up and swing through " the crutches with the involved leg. The involved leg does not touch the floor.  Toe Touch Weight Bearing (TTWB): Move the involved leg forward. Rest it lightly on the floor for balance only. Step through the crutches with the uninvolved leg.  Partial Weight Bearing (PWB): Move the involved leg forward. Step down the weight of the leg only.  Step through the crutches with the uninvolved leg.  Weight Bearing As Tolerated (WBAT): Move the involved leg forward. Put as much pressure through the involved leg as you can tolerate comfortably. Then step through the crutches with the uninvolved leg.       ACL RECONSTRUCTION WITHOUT MENISCAL REPAIR POST OPERATIVE INSTRUCTIONS     WBAT WITH KNEE IMMOBILIZER PROTOCOL      FOLLOW UP APPOINTMENT  A follow-up appointment with Dr. Carrillo should be scheduled approximately one to two weeks after surgery. If your appointment was not scheduled prior to surgery, please call [phone].    Your follow up appointment will be at the location that you regularly see Dr. Carrillo:    [facility]  [address] [place] [id number] [phone]    [place] [address]  [phone]    Physical therapy:   Physical therapy should begin 3-5 days after surgery. An order for physical therapy will be provided by Dr. Carrillo's office but it will be your responsibility to schedule the first appointment at the location of your choice.     ACTIVITY  Weight bearing status:   You will be allowed to weight bear as tolerated on your operative leg using assistive devices (crutches) as needed. The knee immobilizer should remain on at all times when up.     Knee Immobilizer:  The knee immobilizer should be worn at all times and may only be removed for hygiene while safely seated or lying down. You are not allowed to flex your knee until permitted by your physical  therapist or Dr. Carrillo. Wear knee immobilizer at all times (even with sleep) until it is discontinued by your provider.     Exercises:   Perform the following exercises at least three times per day for the first four weeks after surgery to prevent complications, such as blood clots in your legs:  1) Point and flex your feet  2) Move your ankle around in big circles  3) Wiggle your toes   Also, perform thigh muscle tightening exercises for 10 to 15 minutes at least three times per day for the first four weeks after surgery.    Athletic Activities:  Activities such as swimming, bicycling, jogging, running, and stop-and-go sports should be avoided until permitted by your provider.    Driving:  Driving is not permitted until directed by your provider. Typically, driving is restricted for three to four weeks after right knee surgery and three weeks after left knee surgery. Under no circumstance are you permitted to drive while using narcotic pain medications.    Return to Work:  Return to work as soon as possible.  Your ability to work depends on a number of factors - your level of discomfort and how much demand your job puts on your knees.  If you have any questions, please call Dr. Carrillo's office.      COMFORT AND PAIN MANAGEMENT  Elevation:   During times of inactivity throughout the first two weeks after surgery, make an effort to decrease swelling by elevating your operative extremity. This is most effectively done by lying down and placing several pillows lengthwise under your thigh and calf to raise your toes above the level of your nose. To ensure that your knee remains in full extension, do not place pillows directly under your knee.     Icing:  An ice pack will be provided to control swelling and discomfort after surgery. Place a thin towel on your skin and apply the ice pack overtop. You may apply ice for 20 minutes as often as two times per hour.    Pain Medications:  You will be discharged with  acetaminophen (Tylenol) and a narcotic medication for pain management after surgery. Acetaminophen is most effective when it is taken per the schedule outlined by your provider (every four, six, or eight hours as prescribed). You may safely use acetaminophen as prescribed for the first four weeks after surgery provided you do not exceed the maximum daily dose prescribed by your provider (usually 3000 mg - 4000 mg). The narcotic pain medication should only be taken on an as-needed basis when necessary and should be reserved for severe pain that is not controlled with scheduled acetaminophen. In the first three days following surgery, your symptoms may warrant use of the narcotic pain medication every three, four, or six hours as prescribed. After three days, focus your efforts on decreasing (tapering) use of narcotic medications.   The most successful tapering strategy is to first, decrease the dose (number of tablets) and second, increase the interval (time in between doses). For example, if you begin taking two tablets every four hours after surgery, start your taper by decreasing one of these doses to one tablet. Every one to two days, decrease another dose to one tablet until you are eventually taking one tablet every four hours. Once this is achieved, focus on increasing the number of hours between doses, moving from one tablet every four hours to one tablet every six hours. As tolerated, continue to increase the interval to eight and twelve hours. Eventually, taper to one dose every evening and discontinue when no longer needed.       ANTICOAGULATION  Depending on your risk factors, your provider may prescribe aspirin to prevent blood clots. If prescribed, take aspirin daily for the first four weeks after surgery.    WOUND CARE AND SHOWERING  Wound care:  Keep the initial post-op dressing on, clean, and dry for the first three days after surgery. 72 hours after surgery, you may remove the dressing. Your  surgical incisions were closed with steristrips (small white tape that is directly on the incision areas) that should be left on until they fall off or are removed at your first office visit. Ok to leave the incision open to air after dressing is removed. Do not apply any lotions, creams, or ointments to the surgical site. All sutures will also be removed at your first office visit. Under no circumstance should you pick or scratch your incision.    Showering:  You may shower on the third day after surgery (immediatly after the dressing is removed) provided your incision is intact and dry without drainage. You may allow water to run over the incision, but do not soak or submerge the incision. Do not scrub the incision.     Tub Bathing:  Tub bathing, swimming, or any other activities that cause your incision to be submerged should be avoided until allowed by your provider. Typically, patients are allowed to return to these activities four weeks after surgery.      CONTACTING YOUR PHYSICIAN:  You may experience symptoms that require follow-up before your scheduled appointment. Please contact Dr. Carrillo's office if you experience:  1) Pain in your knee that persists or worsens in the first few days after surgery  2) Excessive redness or drainage of cloudy or bloody material from the wounds (clear red tinted fluid and some mild drainage should be expected) or drainage of any kind five days after surgery  3) A temperature elevation greater than 101.5 F   4) Pain, swelling or redness in your calf  5) Numbness or weakness in your leg or foot      Regular business hours (Monday - Friday, 8am - 5pm):  [facility]: [phone]  [facility]: [phone]    After hours and weekends:  [facility] on call Orthopedic resident: [phone]

## 2023-01-31 NOTE — ANESTHESIA POSTPROCEDURE EVALUATION
Patient: Froy Hernández    Procedure: Procedure(s):  Examination under anesthesia, knee arthroscopy, revision bone tendon bone autograft ACL reconstruction,  meniscus surgery       Anesthesia Type:  General    Note:  Disposition: Outpatient   Postop Pain Control: Uneventful            Sign Out: Well controlled pain   PONV: No   Neuro/Psych: Uneventful            Sign Out: Acceptable/Baseline neuro status   Airway/Respiratory: Uneventful            Sign Out: Acceptable/Baseline resp. status   CV/Hemodynamics: Uneventful            Sign Out: Acceptable CV status; No obvious hypovolemia; No obvious fluid overload   Other NRE: NONE   DID A NON-ROUTINE EVENT OCCUR? No           Last vitals:  Vitals Value Taken Time   /66 01/31/23 1159   Temp 36.7  C (98.1  F) 01/31/23 1159   Pulse 75 01/31/23 1159   Resp 18 01/31/23 1159   SpO2 99 % 01/31/23 1159       Electronically Signed By: Carlos Sosa MD  January 31, 2023  12:54 PM

## 2023-01-31 NOTE — BRIEF OP NOTE
[facility]    Brief Operative Note    Pre-operative diagnosis: Tear of anterior cruciate ligament graft, initial encounter (H) [T84.89XA]  Post-operative diagnosis Same as pre-operative diagnosis    Procedure: Procedure(s):  Examination under anesthesia, knee arthroscopy, revision bone tendon bone autograft ACL reconstruction,  meniscus surgery  Surgeon: Surgeon(s) and Role:     * Alberto Carrillo MD - Primary     * Hillary Galo MD - Resident - Assisting  Anesthesia: Choice   Estimated Blood Loss: 25 mL from 1/31/2023  9:13 AM to 1/31/2023 11:26 AM      Drains: None  Specimens: * No specimens in log *  Findings:   Please see op note.  Complications: None.  Implants:   Implant Name Type Inv. Item Serial No.  Lot No. LRB No. Used Action   IMP SCR [employer]P INTERF FAST THRD 7X20MM AR-4020C-07 - [id number] Metallic Hardware/[employer] IMP SCR [employer] BIOCOMP INTERF FAST THRD 7X20MM AR-4020C-07  [employer] [id number] Right 1 Implanted   IMP SCR [employer] INTERF FAST THRD 8X20MM AR-4020C-08 - [id number] Metallic Hardware/[employer] IMP SCR [employer] BIOCOMP INTERF FAST THRD 8X20MM AR-4020C-08  [employer] [id number] Right 1 Implanted     Postop Plan: Disposition: DC Home                        Weight Bearing: WBAT                        Brace: KI to remain in place at ALL times except for hygiene                        Pain Meds: Multimodal pain regimen at discharge                        Chemical DVT Prophylaxis: Mechanical                         Abx: None                        F/u: 2 weeks as scheduled (2/8/2023)    Hillary Galo MD  Orthopaedic Surgery, PGY-5

## 2023-01-31 NOTE — ANESTHESIA CARE TRANSFER NOTE
Patient: Froy Hernández    Procedure: Procedure(s):  Examination under anesthesia, knee arthroscopy, revision bone tendon bone autograft ACL reconstruction,  meniscus surgery       Diagnosis: Tear of anterior cruciate ligament graft, initial encounter (H) [T84.89XA]  Diagnosis Additional Information: No value filed.    Anesthesia Type:   General     Note:    Oropharynx: oropharynx clear of all foreign objects and spontaneously breathing  Level of Consciousness: awake  Oxygen Supplementation: face mask    Independent Airway: airway patency satisfactory and stable  Dentition: dentition unchanged  Vital Signs Stable: post-procedure vital signs reviewed and stable  Report to RN Given: handoff report given  Patient transferred to: PACU    Handoff Report: Identifed the Patient, Identified the Reponsible Provider, Reviewed the pertinent medical history, Discussed the surgical course, Reviewed Intra-OP anesthesia mangement and issues during anesthesia, Set expectations for post-procedure period and Allowed opportunity for questions and acknowledgement of understanding      Vitals:  Vitals Value Taken Time   /76 01/31/23 1128   Temp 36.9  C (98.5  F) 01/31/23 1128   Pulse 80 01/31/23 1128   Resp 16 01/31/23 1128   SpO2 100 % 01/31/23 1128       Electronically Signed By: TONO Lynn CRNA  January 31, 2023  11:32 AM

## 2023-01-31 NOTE — OP NOTE
PREOPERATIVE DIAGNOSIS:   1. Failed ACL reconstruction right knee status post hamstring autograft ACL reconstruction  2. Questionable tear of medial meniscus versus meniscal capsular separation    POSTOPERATIVE DIAGNOSIS:  1. Failed ACL reconstruction right knee status post hamstring autograft ACL reconstruction  2. Intact medial meniscus and no meniscal capsular separation  3. Intact lateral meniscus    PROCEDURE:  1. Examination under anesthesia right knee  2. Right knee arthroscopy  3. Single-stage revision ACL reconstruction bone tendon bone autograft    DATE OF SURGERY: 1/31/2023    SURGEON: Alberto Carrillo MD    ASSISTANT: None.     RESIDENT OR FELLOW: Gaby Galo MD    OPERATIVE INDICATIONS: Froy Hernández is a pleasant 27 year old who I saw through my orthopedic clinic with a history, physical, imaging consistent with right knee pain after specific injury.  This was found to be a rupture of his anterior cruciate ligament.  He previously had undergone a hamstring autograft ACL reconstruction.  We performed revision ACL reconstruction bone tendon bone autograft.  He desired to proceed..  I reviewed with the patient the risks, benefits, complications, techniques and alternatives to surgery.  We reviewed the expected course of recovery and the potential expected outcomes.  The patient understood both the risks and benefits and desired to proceed despite the risks.    OPERATIVE DETAILS: In the preoperative area the patient's informed consent was reviewed and they desired to proceed.  The right leg was marked and the patient was in agreement.  The patient was taken to the operating room where a timeout was performed and all parties were in agreement.  Preoperative antibiotics were given within 1 hour of the time of incision.  The patient was placed in the supine position and surrendered to LMA anesthesia.  No tourniquet was applied.  Egg crate was placed beneath the well leg and a side post was  "utilized.  The operative leg was prepped and draped in the usual sterile fashion.     Examination under anesthesia: Range of motion 0 to 135 degrees, 1 quadrant medial and 2 quadrant lateral translation of the patella, stable to varus and valgus stress testing, stable posterior drawer testing, 2+ anterior drawer testing, 2B Lachman, 1+ pivot shift    Graft harvest and preparation: A 5 cm midline incision was made and hemostasis was insured with the Bovie electrocautery.  The peritenon was opened longitudinally.  And we selected a section of tendon 10 mm in width.  We then marked on the tibial side 10 x 25 mm.  This was marked with a knife, defined with a Bovie and cut with an oscillating saw it was delivered into the wound with an osteotome.  The knee was brought to full extension where a proximal patellar retractor was placed.  We selected a section of bone 10 mm in width by 20 mm in length it was marked with a knife to find with the Bovie and cut with an oscillating saw.  No malleting was performed of the patella.    The graft was taken to the back table where it was fashioned to a 10 on the femur size 10 on the tibia.  2 drill holes were placed in each of the bone blocks and #2 fiber wires were placed through these holes.  A \"safety stitch\" was placed at the bone tendon interface on the tibial side.  Ink marking pen was used to bang the bone tendon interface in the femoral side.  The final graft dimensions showed a 20 mm bone block on the femoral side, a 25 mm bone block on the tibial side and the tibial plus tendon length of 74 mm.      Anterior medial and anterior lateral arthroscopic portals were created and a diagnostic arthroscopy was performed with the following findings: The medial patella facet, lateral patella facet, central ridge of the patella showed normal cartilage.  The trochlear cartilage was normal.  The medial femoral condyle showed normal cartilage and medial tibial plateau showed normal " cartilage. The lateral femoral condyle showed normal cartilage and lateral tibial plateau showed normal. The Medial meniscus stable and intact to probing, no meniscal capsular separation when viewed through the Gillquist portal and Lateral Meniscus normal.  There was a grade 3 rupture of the anterior cruciate ligament with positive empty wall sign.  The PCL was intact.  There was no opening to varus and valgus stress testing in either the lateral or medial compartments, respectively.    A debridement of the nonfunctioning ACL graft was then performed until we could visualize the anatomic insertion sites of the ACL graft on both the femoral and the tibial origin.  Remnant preservation was pursued in the tibial side and the tibial tunnel was centered midway between the medial and lateral tibial spines in line with the posterior aspect of the anterior horn of the lateral meniscus.    A tip to tip guide was introduced through the medial portal.  Our osseous length measured 45 to accommodate the tibial plus tendon length of our graft.  A 2.4 mm drill to pin was placed into the center of the anatomic insertion of the ACL on the tibial side.  A 10 mm reamer was then placed over this guidepin.  When viewing of the tibia there is no evidence of cystic dilation of the tibial tunnel we dilated sequentially from 10-10.5 mm.  A plug was placed on the tibial side.    A spinal needle was then used to localize our accessory medial portal.  Once we are satisfied with its position a Preet awl was used to select our location along the anatomic insertion of the ACL on the femoral footprint.  A Beath pin was placed.  The knee was hyperflexed.  The pin was advanced through the femur and a 10 mm low-profile reamer was used to ream a 25 mm femoral socket.  Bone debris was removed with motorized suction.  While viewing of the tunnel there was no evidence of cystic dilation A passing suture was placed which was routed through the tibia.   Notching of the femoral tunnel was then performed.    The graft was brought up the patellar donor bone block was reduced through the tibial tunnel and dunked into the femur where it was fixed with a 7 x 20 mm bio composite interference screw.  Excellent purchase of the screw is noted.  The graft was cycled 20 times, maximal manual traction was applied and an 8 x 20 mm bio composite mm screw was placed in the tibial side with excellent purchase.  Lachman 0, no pivot shift, final arthroscopic images showed clearance along the root of the intercondylar notch and extension, clearance along the lateral wall and PCL in flexion.  Good tension to probing.    Copious irrigation was performed an a layered closure was initiated, sterile dressings were applied and the patient was transferred to the recovery room in stable condition with stable vital signs.    ESTIMATED BLOOD LOSS: 25 mL.    TOURNIQUET TIME: No tourniquet was placed.    COMPLICATIONS: None apparent.    DRAINS: None.    SPECIMENS: None.     POSTOPERATIVE PLAN:  Weightbearing as tolerated, wean from crutches when able  Knee immobilizer times 1 week then wean when able  Average time to wean from crutches and brace is 2-3 weeks  The goal is to walk into my clinic at 6 weeks with no brace and no crutches  No running until 3 months  No sports until 6 months, return to game competition at 7-10 months  Shower on day 3  Start physical therapy day 3-5

## 2023-01-31 NOTE — OR NURSING
Patient received right side Adductor nerve block  with Exparel.  Fentanyl 50mcg and Versed 2mg given. Tolerated procedure well.

## 2023-02-01 ENCOUNTER — NURSE TRIAGE (OUTPATIENT)
Dept: NURSING | Facility: CLINIC | Age: 28
End: 2023-02-01

## 2023-02-01 DIAGNOSIS — T84.89XA TEAR OF ANTERIOR CRUCIATE LIGAMENT GRAFT, INITIAL ENCOUNTER (H): ICD-10-CM

## 2023-02-01 RX ORDER — OXYCODONE HYDROCHLORIDE 5 MG/1
5-10 TABLET ORAL EVERY 4 HOURS PRN
Qty: 20 TABLET | Refills: 0 | Status: SHIPPED | OUTPATIENT
Start: 2023-02-01 | End: 2023-02-08

## 2023-02-01 NOTE — TELEPHONE ENCOUNTER
Discussed with Dr. Carrillo, called Pt back and really pushed deep breathing coughing exercises. Spouse said that he was already feeling better after taking ibuprofen and 10 mg oxycodone. They will stay on top of the medication and do breathing exercises. Refill request sent to Dr. Carrillo to be addressed tomorrow in clinic.    We also discussed a letter for the [employer] stating how long we anticipate he will be out. Emailed this letter per their request.     Bradley Wiggins RN

## 2023-02-01 NOTE — TELEPHONE ENCOUNTER
[facility] Call Center    Phone Message    May a detailed message be left on voicemail: no     Reason for Call: Symptoms or Concerns   PO patient w/temp of 103.5. Transferred wife to red flag triage-would also like c/b from Dr Carrillo's nurse      Action Taken: Message routed to:  [facility] ([facility]     Travel Screening: Not Applicable

## 2023-02-01 NOTE — TELEPHONE ENCOUNTER
Nurse Triage SBAR    Is this a 2nd Level Triage?    Yes     Situation:   Increased pain and fever after surgery    Background/Assessment:     On 1/31/2023 Pt had knee arthroscopy, revision bone tendon bone autograft ACL reconstruction, (Right: Knee)     Wife reporting, Pt had a fever last night of 103.6.   Called the On call Provider and Gave the Pt 3   325 Mg Tabs of Tylenol    The fever today is 101.3 (Oral temp)    Also, 5 Mg's of Oxycodone is not helping with the pain.    So Pt wondering if they can take 10 Mg's every 4 hours for pain relief so the Pt can get some rest.         So wondering if they can get an order for 30 Tabs sent to the pharmacy for Pt care.  Family only has about 16 tabs and worried if he take 2 Tabs every 4 hours he will run out far quick.     Please call the family back @ [phone] for further assistance.      Protocol Recommended Disposition:   See in Office Today or Tomorrow      Reason for Disposition    Patient wants to be seen    Additional Information    Negative: Major abdominal surgical incision and wound gaping open with visible internal organs    Negative: Sounds like a life-threatening emergency to the triager    Negative: Bleeding from incision and won't stop after 10 minutes of direct pressure    Negative: Bleeding (more than a few drops) from incision and after blood vessel surgery (e.g., carotidendarterectomy, femoral bypass graft, kidney dialysis fistula, tracheostomy)    Negative: Bright red, wide-spread, sunburn-like rash    Negative: SEVERE pain in the incision    Negative: Incision gaping open and < 2 days (48 hours) since wound re-opened    Negative: Incision gaping open and length of opening > 2 inches (5 cm)    Negative: Patient sounds very sick or weak to the triager    Negative: Sounds like a serious complication to the triager    Negative: Fever > 100.4 F (38.0 C)    Negative: Incision looks infected (spreading redness, pain)    Negative: Red streak runs from  the incision and longer than 1 inch (2.5 cm)    Negative: Pus or bad-smelling fluid draining from incision    Negative: Dressing soaked with blood or body fluid (e.g., drainage)    Negative: Raised bruise and size > 2 inches (5 cm) and expanding    Negative: Scant bleeding (e.g., few drops) from incision and after blood vessel surgery (e.g., carotid endarterectomy, femoral bypass graft, kidney dialysis fistula    Negative: Caller has URGENT question and triager unable to answer question    Negative: Incision gaping open and length of opening > 1/4 inch (6 mm) and on the face and over 2 days since wound re-opened    Negative: Incision gaping open and length of opening > 1/2 inch (1 cm) and over 2 days since wound re-opened    Negative: Clear or blood-tinged fluid draining from wound and no fever    Negative: Suture or staple removal is overdue    Protocols used: POST-OP INCISION SYMPTOMS AND [id number]

## 2023-02-01 NOTE — TELEPHONE ENCOUNTER
S/p Right knee arthroscopic EUA, revision bone tendon bone autograft ACL reconstruction, DOS: 1/31/23.    Message sent to Dr. Carrillo to review and advise.   Called Pt's spouse to discuss. The lowest they have been able to get his fever is about 101.5 with Tyenol, but then back up to 103-eri within a few hours. Pt has not been taking ibuprofen as they weren't sure if it interfered with medication he is already taking. I advised that we typically don't want ibuprofen taken right around same time as ASA, but can be taken a couple hours after and still get the DVT prophylactic affect. Pt will alternate 975 mg Tylenol and 600 mg ibuprofen so they are teaching each medication every 6 hours. Pt will also try 10 mg oxycodone as it is written on the prescription a refill has been pended, but waiting to see what Dr. Carrillo says before sending to him.I advised that we would call them today after hearing from Dr. Carrillo.     Bradley Wiggins RN

## 2023-02-01 NOTE — LETTER
February 1, 2023      RE: Froy Hernández  [id number] [facility] [id number]       To whom it may concern:    Froy Hernández is under my professional care for his right knee. He underwent surgery on 1/31/23. He is anticipated to be off duty/at home for at least six weeks (3/13/2023). His restrictions and anticipated time off will be updated at that time.    Sincerely,      Alberto Carrillo MD

## 2023-02-05 ENCOUNTER — HEALTH MAINTENANCE LETTER (OUTPATIENT)
Age: 28
End: 2023-02-05

## 2023-02-05 NOTE — PROGRESS NOTES
Physical Therapy Initial Evaluation    Therapist Assessment: Froy Hernández is a 27 year old male patient presenting to Physical Therapy with R knee pain s/p R ACL reconstruction with BTB autograft on 1/31/2023 via Dr. Carrillo. Patient demonstrates pain with ambulation using bilateral axillary crutches and knee immobilizer, limited R knee ROM into flexion and extension, and edema at knee and distally to ankle. Signs and symptoms are consistent with R ACL reconstruction with BTB autograft. These impairments limit their ability to ambulate with least restrictive gait device with comfort, ascend and descend stairs, sleep comfortably, squat/kneel, and return to recreational activities comfortably at this point. Please see subjective section for post-op protocol. Skilled PT services are necessary in order to reduce impairments and improve independent function.    Subjective:  The history is provided by the patient.   Therapist Generated HPI Evaluation  Problem details: Patient reports to outpatient physical therapy with R knee pain s/p ACL reconstruction on 1/31/2023. His injury occurred on 12/30/2022 when patient was playing basketball and landed on his R LE resulting in tear (non-contact). Patient underwent a single-stage revision ACL reconstruction bone tendon bone autograft from Dr. Alberto Carrillo. Patient reports that last night his ankle was swollen and this morning was experiencing pitting edema. He states that he is having difficulty lifting leg up. He is currently on pain medications, but it does not seem to be helping as much.     Of note, patient has a hx of bilateral ACL reconstructions including right side in 2017 in [facility] with hamstring autograft also related to non-contact basketball.     POSTOPERATIVE PLAN:  1. Weightbearing as tolerated, wean from crutches when able  2. Knee immobilizer times 1 week then wean when able  3. Average time to wean from crutches and brace is 2-3  weeks  4. The goal is to walk into my clinic at 6 weeks with no brace and no crutches  5. No running until 3 months  6. No sports until 6 months, return to game competition at 7-10 months  7. Shower on day 3  8. Start physical therapy day 3-5    Goals:  1) walk with least restrictive gait device  2) return knee ROM  3) ascend and descend stairs more comfortably  4) return to recreational activities .         Type of problem:  Right knee.    This is a new condition.  Condition occurred with:  A wrong landing.  Where condition occurred: during recreation/sport.  Patient reports pain:  Anterior.  Pain is described as aching and is constant.  Pain is the same all the time.    Symptoms are exacerbated by ascending stairs, kneeling, lying on the extremity, bending/squatting, descending stairs, weight bearing, walking and certain positions  and relieved by ice and NSAID's (elevation of LE).  Special tests included:  MRI and x-ray.  Previous treatment includes physical therapy and surgery (PT for previous injuries).   Restrictions due to condition include:  Currently not working due to present treatment.  Barriers include:  Requires assistance with ADL's.    Patient Health History  Froy Hernández being seen for ACL surgery.     Problem began: 12/30/2022.   Problem occurred: basketball   Pain is reported as 4/10 on pain scale.  General health as reported by patient is good.       Medical allergies: other (Penicillin ).   Surgeries include:  Orthopedic surgery (ACL).    Current medications:  Muscle relaxants, anti-inflammatory and pain medication.    Current occupation is [employer] .   Primary job tasks include:  Computer work, prolonged sitting and prolonged standing.                                    Objective:    Gait:  Patient demonstrates decreased weightbearing R LE due to pain.  Gait Type:  Antalgic   Weight Bearing Status:  WBAT   Assistive Devices:  Brace and crutches                                                         Knee Evaluation:  ROM:    AROM    Hyperextension: Left:  1    Right:  Extension:  Left: 0    Right:  3 +  Flexion: Left: 125    Right: 72 +    Pain: +    Strength:         Quad Set Left: Good    Pain:   Quad Set Right: Fair    Pain:  Ligament Testing:  Not Assessed (limited due to surgery)                Special Tests: Not Assessed (limited due to surgery)      Palpation:  Palpation of knee: limited due to surgery and pain level; anterior aspect of R knee painful. Pain at distal R medial ankle distal to malleolia with STM.      Edema:  Edema of the knee: Increased swelling at knee and at R ankle. 32 cm inferior to malleoli R. 30 cm inferior to malleoli L. 31.5 cm sueprior to malleoli R.    Mobility Testing:  Not Assessed (limited due to surgery)            Functional Testing:  not assessed (limited due to surgery)                Transfers: utilizes L LE to lift R LE on and off treatment table. Pain with transitions from seated to supine and sidelying to seated.  General     ROS    Assessment/Plan:    Patient is a 27 year old male with right side knee complaints.    Patient has the following significant findings with corresponding treatment plan.                Diagnosis 1:  R knee pain s/p R ACL reconstruction with BTB autograft  Pain -  hot/cold therapy, electric stimulation, manual therapy, STS, splint/taping/bracing/orthotics, self management, education, directional preference exercise and home program  Decreased ROM/flexibility - manual therapy, therapeutic exercise, therapeutic activity and home program  Decreased strength - therapeutic exercise, therapeutic activities and home program  Impaired balance - neuro re-education, therapeutic activities, adaptive equipment/assistive device and home program  Decreased proprioception - neuro re-education, gait training, therapeutic activities and home program  Edema - vasopneumatics, electric stimulation, cold therapy, cryocuff and self  management/home program  Impaired gait - gait training, assistive devices and home program  Impaired muscle performance - neuro re-education and home program  Decreased function - therapeutic activities and home program    Therapy Evaluation Codes:     Cumulative Therapy Evaluation is: Low complexity.    Previous and current functional limitations:  (See Goal Flow Sheet for this information)    Short term and Long term goals: (See Goal Flow Sheet for this information)     Communication ability:  Patient appears to be able to clearly communicate and understand verbal and written communication and follow directions correctly.  Treatment Explanation - The following has been discussed with the patient:   RX ordered/plan of care  Anticipated outcomes  Possible risks and side effects  This patient would benefit from PT intervention to resume normal activities.   Rehab potential is excellent.    Frequency:  1-2 X week, once daily progressing to 1 x every other week  Duration:  for 3+ months  Discharge Plan:  Achieve all LTG.  Independent in home treatment program.  Reach maximal therapeutic benefit.    Please refer to the daily flowsheet for treatment today, total treatment time and time spent performing 1:1 timed codes.

## 2023-02-06 ENCOUNTER — THERAPY VISIT (OUTPATIENT)
Dept: PHYSICAL THERAPY | Facility: CLINIC | Age: 28
End: 2023-02-06
Payer: OTHER GOVERNMENT

## 2023-02-06 DIAGNOSIS — M25.561 RIGHT KNEE PAIN: Primary | ICD-10-CM

## 2023-02-06 DIAGNOSIS — T84.89XA TEAR OF ANTERIOR CRUCIATE LIGAMENT GRAFT, INITIAL ENCOUNTER (H): ICD-10-CM

## 2023-02-06 PROCEDURE — 97110 THERAPEUTIC EXERCISES: CPT | Mod: GP

## 2023-02-06 PROCEDURE — 97161 PT EVAL LOW COMPLEX 20 MIN: CPT | Mod: GP

## 2023-02-06 PROCEDURE — 97530 THERAPEUTIC ACTIVITIES: CPT | Mod: GP

## 2023-02-06 ASSESSMENT — ACTIVITIES OF DAILY LIVING (ADL)
SQUAT: I AM UNABLE TO DO THE ACTIVITY
WEAKNESS: THE SYMPTOM PREVENTS ME FROM ALL DAILY ACTIVITIES
STIFFNESS: THE SYMPTOM AFFECTS MY ACTIVITY SEVERELY
GO UP STAIRS: I AM UNABLE TO DO THE ACTIVITY
GIVING WAY, BUCKLING OR SHIFTING OF KNEE: THE SYMPTOM PREVENTS ME FROM ALL DAILY ACTIVITIES
STAND: ACTIVITY IS VERY DIFFICULT
KNEE_ACTIVITY_OF_DAILY_LIVING_SCORE: 8.57
KNEE_ACTIVITY_OF_DAILY_LIVING_SUM: 6
KNEEL ON THE FRONT OF YOUR KNEE: I AM UNABLE TO DO THE ACTIVITY
GO DOWN STAIRS: ACTIVITY IS VERY DIFFICULT
RISE FROM A CHAIR: ACTIVITY IS VERY DIFFICULT
WALK: I AM UNABLE TO DO THE ACTIVITY
RAW_SCORE: 6
PAIN: THE SYMPTOM AFFECTS MY ACTIVITY SEVERELY
SWELLING: THE SYMPTOM PREVENTS ME FROM ALL DAILY ACTIVITIES
SIT WITH YOUR KNEE BENT: ACTIVITY IS VERY DIFFICULT
LIMPING: THE SYMPTOM PREVENTS ME FROM ALL DAILY ACTIVITIES
HOW_WOULD_YOU_RATE_THE_CURRENT_FUNCTION_OF_YOUR_KNEE_DURING_YOUR_USUAL_DAILY_ACTIVITIES_ON_A_SCALE_FROM_0_TO_100_WITH_100_BEING_YOUR_LEVEL_OF_KNEE_FUNCTION_PRIOR_TO_YOUR_INJURY_AND_0_BEING_THE_INABILITY_TO_PERFORM_ANY_OF_YOUR_USUAL_DAILY_ACTIVITIES?: 0

## 2023-02-08 ENCOUNTER — OFFICE VISIT (OUTPATIENT)
Dept: ORTHOPEDICS | Facility: CLINIC | Age: 28
End: 2023-02-08
Payer: OTHER GOVERNMENT

## 2023-02-08 DIAGNOSIS — T84.89XA TEAR OF ANTERIOR CRUCIATE LIGAMENT GRAFT, INITIAL ENCOUNTER (H): ICD-10-CM

## 2023-02-08 PROCEDURE — 99024 POSTOP FOLLOW-UP VISIT: CPT

## 2023-02-08 RX ORDER — OXYCODONE HYDROCHLORIDE 5 MG/1
5-10 TABLET ORAL EVERY 6 HOURS PRN
Qty: 20 TABLET | Refills: 0 | Status: SHIPPED | OUTPATIENT
Start: 2023-02-08 | End: 2023-04-04

## 2023-02-08 NOTE — LETTER
2/8/2023         RE: Froy Hernández  [address] [address] [facility] [address]        Dear Colleague,    Thank you for referring your patient, Froy Hernández, to the [facility] [place]. Please see a copy of my visit note below.    Chief Complaint:   1. Follow up, DOS 1/31/23 with Dr. Carrillo    Procedures:  1. Examination under anesthesia right knee  2. Right knee arthroscopy  3. Single-stage revision ACL reconstruction bone tendon bone autograft       History:  Froy Hernández is a 27 year old patient s/p above procedure, here for follow up. He has had some difficulties with pain control since surgery, taking oxycodone 5 mg every ~4 hours and tylenol 650 mg every 6 hours. Working with physical therapy, has future visits scheduled. Noticed right ankle swelling over the weekend, thinks it is improving. No additional concerns today.       Exam:     General: Awake, Alert, and oriented. Articulates and communicates with a normal affect     Right Lower Extremity:    Incisions well healed without evidence of infection, no erythema, drainage, or wound dehiscence     Normal post-operative effusion and ecchymosis    Range of motion and stability exam not performed    TA/Gsc/EHL/FHL with 5/5 strength    Distal pulses palpable, toes are warm and well perfused     Gait: Antalgic with use of crutches and KI    Imaging:  No new imaging.     Medications:   -Oxycodone 5 mg: Taking 5 mg q 4-5 hours   -Tylenol 325 mg: Taking q 6 hours   -Hydroxyzine 25 mg: Not taking   - mg: Taking daily for DVT prophylaxis     Assessment:  Doing well 1 week s/p right knee arthroscopy, single stage revision ACL reconstruction BTB autograft with Dr. Carrillo. Basic wound cares were performed today,I did not appreciate signs of infection. We discussed the plan below, all questions were answered to the best of my ability.     Plan:   -Weight bearing: WBAT, ok to wean from knee immobilizer    -Range of motion as tolerated   -Continue work with PT   -DVT prophylaxis:  mg daily   -Oxycodone refilled, discussed plan for weaning from opioid pain medication   -Follow up: 6 weeks with Dr. Gerardo Smallwood PA-C 2/8/2023 4:59 PM  Orthopedic Surgery

## 2023-02-08 NOTE — NURSING NOTE
Reason For Visit:   Chief Complaint   Patient presents with     RECHECK     1 Week post op from right knee arthroscopy, revision bone tendon bone autograft ACL reconstruction, - Right, right meniscus surgery. DOS: 01/31/20 with Dr Carrillo. Pt states they still have pain. They also state their ankle is swollen and they had fevers the first two nights after surgery. The fevers have since stopped. They tried to sleep without pain medication last night but that did not work.            There were no vitals taken for this visit.    Pain Assessment  Patient Currently in Pain: Yes  0-10 Pain Scale: 5  Primary Pain Location: Knee (Right)    Tee Hart, EMT

## 2023-02-08 NOTE — PROGRESS NOTES
Chief Complaint:   1. Follow up, DOS 1/31/23 with Dr. Carrillo    Procedures:  1. Examination under anesthesia right knee  2. Right knee arthroscopy  3. Single-stage revision ACL reconstruction bone tendon bone autograft       History:  Fryo Hernández is a 27 year old patient s/p above procedure, here for follow up. He has had some difficulties with pain control since surgery, taking oxycodone 5 mg every ~4 hours and tylenol 650 mg every 6 hours. Working with physical therapy, has future visits scheduled. Noticed right ankle swelling over the weekend, thinks it is improving. No additional concerns today.       Exam:     General: Awake, Alert, and oriented. Articulates and communicates with a normal affect     Right Lower Extremity:    Incisions well healed without evidence of infection, no erythema, drainage, or wound dehiscence     Normal post-operative effusion and ecchymosis    Range of motion and stability exam not performed    TA/Gsc/EHL/FHL with 5/5 strength    Distal pulses palpable, toes are warm and well perfused     Gait: Antalgic with use of crutches and KI    Imaging:  No new imaging.     Medications:   -Oxycodone 5 mg: Taking 5 mg q 4-5 hours   -Tylenol 325 mg: Taking q 6 hours   -Hydroxyzine 25 mg: Not taking   - mg: Taking daily for DVT prophylaxis     Assessment:  Doing well 1 week s/p right knee arthroscopy, single stage revision ACL reconstruction BTB autograft with Dr. Carrillo. Basic wound cares were performed today,I did not appreciate signs of infection. We discussed the plan below, all questions were answered to the best of my ability.     Plan:   -Weight bearing: WBAT, ok to wean from knee immobilizer   -Range of motion as tolerated   -Continue work with PT   -DVT prophylaxis:  mg daily   -Oxycodone refilled, discussed plan for weaning from opioid pain medication   -Follow up: 6 weeks with Dr. Gerardo Smallwood PA-C 2/8/2023 4:59 PM  Orthopedic Surgery

## 2023-02-14 ENCOUNTER — THERAPY VISIT (OUTPATIENT)
Dept: PHYSICAL THERAPY | Facility: CLINIC | Age: 28
End: 2023-02-14
Payer: OTHER GOVERNMENT

## 2023-02-14 DIAGNOSIS — M25.561 RIGHT KNEE PAIN: Primary | ICD-10-CM

## 2023-02-14 PROCEDURE — 97140 MANUAL THERAPY 1/> REGIONS: CPT | Mod: GP

## 2023-02-14 PROCEDURE — 97110 THERAPEUTIC EXERCISES: CPT | Mod: GP

## 2023-02-27 ENCOUNTER — HOSPITAL ENCOUNTER (OUTPATIENT)
Dept: PHYSICAL THERAPY | Facility: REHABILITATION | Age: 28
Discharge: HOME | End: 2023-02-27
Payer: OTHER GOVERNMENT

## 2023-02-27 DIAGNOSIS — T84.89XA TEAR OF ANTERIOR CRUCIATE LIGAMENT GRAFT, INITIAL ENCOUNTER (H): ICD-10-CM

## 2023-02-27 DIAGNOSIS — M25.60 DECREASED RANGE OF MOTION: ICD-10-CM

## 2023-02-27 DIAGNOSIS — M25.561 ACUTE PAIN OF RIGHT KNEE: Primary | ICD-10-CM

## 2023-02-27 PROCEDURE — 97110 THERAPEUTIC EXERCISES: CPT | Mod: GP

## 2023-02-28 ENCOUNTER — TELEPHONE (OUTPATIENT)
Dept: ORTHOPEDICS | Facility: CLINIC | Age: 28
End: 2023-02-28
Payer: OTHER GOVERNMENT

## 2023-02-28 NOTE — TELEPHONE ENCOUNTER
[facility] Call Center    Phone Message    May a detailed message be left on voicemail: no     Reason for Call: Form or Letter   Type or form/letter needing completion: note excusing him from work & what day he can return to work  Provider: Dr Carrillo   Date form needed: asap  Once completed: Casey      Action Taken: Message routed to:  [facility] ([facility]    Travel Screening: Not Applicable

## 2023-03-01 NOTE — TELEPHONE ENCOUNTER
I spoke with the patient and he would like to remain out of work for another 2 weeks. I let him know that we could write him a note to excuse him until his follow up appointment on 3/13 and then we can discuss his restrictions at this time. The patient agreed and understood this. I let him know that I would write the letter and it should be available for him via [employer] shortly. He had no further questions at this time.     CANDIE Arias

## 2023-03-08 ENCOUNTER — HOSPITAL ENCOUNTER (OUTPATIENT)
Dept: PHYSICAL THERAPY | Facility: REHABILITATION | Age: 28
Discharge: HOME | End: 2023-03-08
Payer: OTHER GOVERNMENT

## 2023-03-08 DIAGNOSIS — M25.561 ACUTE PAIN OF RIGHT KNEE: Primary | ICD-10-CM

## 2023-03-08 PROCEDURE — 97110 THERAPEUTIC EXERCISES: CPT | Mod: GP

## 2023-03-09 DIAGNOSIS — Z98.890 S/P ACL RECONSTRUCTION: Primary | ICD-10-CM

## 2023-03-13 ENCOUNTER — ANCILLARY PROCEDURE (OUTPATIENT)
Dept: GENERAL RADIOLOGY | Facility: CLINIC | Age: 28
End: 2023-03-13
Attending: ORTHOPAEDIC SURGERY
Payer: OTHER GOVERNMENT

## 2023-03-13 ENCOUNTER — OFFICE VISIT (OUTPATIENT)
Dept: ORTHOPEDICS | Facility: CLINIC | Age: 28
End: 2023-03-13
Payer: OTHER GOVERNMENT

## 2023-03-13 VITALS — WEIGHT: 230 LBS | BODY MASS INDEX: 32.93 KG/M2 | HEIGHT: 70 IN

## 2023-03-13 DIAGNOSIS — Z98.890 S/P ACL RECONSTRUCTION: Primary | ICD-10-CM

## 2023-03-13 DIAGNOSIS — Z98.890 S/P ACL RECONSTRUCTION: ICD-10-CM

## 2023-03-13 PROCEDURE — 99024 POSTOP FOLLOW-UP VISIT: CPT | Performed by: ORTHOPAEDIC SURGERY

## 2023-03-13 PROCEDURE — 73560 X-RAY EXAM OF KNEE 1 OR 2: CPT | Mod: RT | Performed by: RADIOLOGY

## 2023-03-13 NOTE — NURSING NOTE
"Reason For Visit:   Chief Complaint   Patient presents with     RECHECK     DOS: 1/31/23 right knee arthroscopy, ACL revision BTB autograft, meniscus surgery     Date of surgery: 1/31/23  Type of surgery:   PROCEDURE:  1. Examination under anesthesia right knee  2. Right knee arthroscopy  Single-stage revision ACL reconstruction bone tendon bone autograft    Overall doing well. He has noticed an increase in swelling on the lateral knee. Occasional cracking that seems relieve pain.    SANE Score  Left Knee: 100  Right Knee: 40-50    Pain Assessment  Patient Currently in Pain: Yes  0-10 Pain Scale: 4  Primary Pain Location: Knee    Ht 1.778 m (5' 10\")   Wt 104.3 kg (230 lb)   BMI 33.00 kg/m           Allergies   Allergen Reactions     Penicillins Unknown     Other reaction(s): *Unknown       Current Outpatient Medications   Medication     acetaminophen (TYLENOL) 325 MG tablet     fluocinonide emulsified base (LIDEX-E) 0.05 % external cream     hydrOXYzine (ATARAX) 25 MG tablet     methocarbamol (ROBAXIN) 500 MG tablet     ondansetron (ZOFRAN ODT) 4 MG ODT tab     oxyCODONE (ROXICODONE) 5 MG tablet     senna-docusate (SENOKOT-S/PERICOLACE) 8.6-50 MG tablet     No current facility-administered medications for this visit.         Juana Reardon, ATC    "

## 2023-03-13 NOTE — LETTER
3/13/2023         RE: Froy Hernández  [address] [address] [id number]        Dear Colleague,    Thank you for referring your patient, Froy Hernández, to the [facility] [place]. Please see a copy of my visit note below.    Diagnosis  1. ACL tear right knee    Procedures:  1. Examination under anesthesia right knee  2. Right knee arthroscopy  3. Single-stage revision ACL reconstruction bone tendon bone autograft      DOS 1/31/23   History:  6 weeks status post revision ACL reconstruction bone tendon bone autograft right knee.  Off opioids.  Doing therapy.  Progressing appropriately.    Exam:     General: Awake, Alert, and oriented. Articulates and communicates with a normal affect     Right Lower Extremity:    Incisions well healed without evidence of infection, no erythema, drainage, or wound dehiscence     Normal post-operative effusion and ecchymosis    Range of motion 0 to 120 degrees     stability exam not performed    TA/Gsc/EHL/FHL with 5/5 strength    Distal pulses palpable, toes are warm and well perfused     Imaging:  AP lateral radiographs reviewed by myself show tunnels and hardware in good position      Assessment:  6 weeks following revision ACL reconstruction.  Doing well.    Plan:     Weightbearing: WBAT    Range of Motion: No range of motion restrictions    Pain Medications: We reviewed post-operative pain medications at today's visit. The patient has stopped all opioid pain medications and no further refills are required    Extension: We reviewed the importance of full knee extension and demonstrated the relevant exercises as appropriate    Crutches/Brace: Patient no longer requires the hinged knee brace or crutches.     Acitivity Restrictions:    Discussed that this is the dangerous time after ACL reconstruction    Reviewed activity restrictions at today's visit    Goal to progress strength and motion to allow straight line running at three  months from the date of surgery    Follow up: 6 weeks with no new radiographs needed y    Again, thank you for allowing me to participate in the care of your patient.        Sincerely,        Alberto Carrillo MD

## 2023-03-13 NOTE — PROGRESS NOTES
Diagnosis  1. ACL tear right knee    Procedures:  1. Examination under anesthesia right knee  2. Right knee arthroscopy  3. Single-stage revision ACL reconstruction bone tendon bone autograft      DOS 1/31/23   History:  6 weeks status post revision ACL reconstruction bone tendon bone autograft right knee.  Off opioids.  Doing therapy.  Progressing appropriately.    Exam:     General: Awake, Alert, and oriented. Articulates and communicates with a normal affect     Right Lower Extremity:    Incisions well healed without evidence of infection, no erythema, drainage, or wound dehiscence     Normal post-operative effusion and ecchymosis    Range of motion 0 to 120 degrees     stability exam not performed    TA/Gsc/EHL/FHL with 5/5 strength    Distal pulses palpable, toes are warm and well perfused     Imaging:  AP lateral radiographs reviewed by myself show tunnels and hardware in good position      Assessment:  6 weeks following revision ACL reconstruction.  Doing well.    Plan:     Weightbearing: WBAT    Range of Motion: No range of motion restrictions    Pain Medications: We reviewed post-operative pain medications at today's visit. The patient has stopped all opioid pain medications and no further refills are required    Extension: We reviewed the importance of full knee extension and demonstrated the relevant exercises as appropriate    Crutches/Brace: Patient no longer requires the hinged knee brace or crutches.     Acitivity Restrictions:    Discussed that this is the dangerous time after ACL reconstruction    Reviewed activity restrictions at today's visit    Goal to progress strength and motion to allow straight line running at three months from the date of surgery    Follow up: 6 weeks with no new radiographs needed y

## 2023-03-14 ENCOUNTER — HOSPITAL ENCOUNTER (OUTPATIENT)
Dept: PHYSICAL THERAPY | Facility: REHABILITATION | Age: 28
Discharge: HOME | End: 2023-03-14
Payer: OTHER GOVERNMENT

## 2023-03-14 DIAGNOSIS — M25.60 DECREASED RANGE OF MOTION: ICD-10-CM

## 2023-03-14 DIAGNOSIS — M25.561 ACUTE PAIN OF RIGHT KNEE: Primary | ICD-10-CM

## 2023-03-14 PROCEDURE — 97110 THERAPEUTIC EXERCISES: CPT | Mod: GP

## 2023-03-30 ENCOUNTER — HOSPITAL ENCOUNTER (OUTPATIENT)
Dept: PHYSICAL THERAPY | Facility: REHABILITATION | Age: 28
Discharge: HOME | End: 2023-03-30
Payer: OTHER GOVERNMENT

## 2023-03-30 DIAGNOSIS — M25.60 DECREASED RANGE OF MOTION: ICD-10-CM

## 2023-03-30 DIAGNOSIS — M25.561 ACUTE PAIN OF RIGHT KNEE: Primary | ICD-10-CM

## 2023-03-30 PROCEDURE — 97110 THERAPEUTIC EXERCISES: CPT | Mod: GP

## 2023-04-01 ENCOUNTER — OFFICE VISIT (OUTPATIENT)
Dept: FAMILY MEDICINE | Facility: CLINIC | Age: 28
End: 2023-04-01
Payer: OTHER GOVERNMENT

## 2023-04-01 VITALS
SYSTOLIC BLOOD PRESSURE: 109 MMHG | OXYGEN SATURATION: 96 % | DIASTOLIC BLOOD PRESSURE: 69 MMHG | RESPIRATION RATE: 16 BRPM | HEART RATE: 95 BPM | BODY MASS INDEX: 32.26 KG/M2 | WEIGHT: 224.8 LBS | TEMPERATURE: 98.3 F

## 2023-04-01 DIAGNOSIS — J02.0 STREP PHARYNGITIS: Primary | ICD-10-CM

## 2023-04-01 LAB — DEPRECATED S PYO AG THROAT QL EIA: POSITIVE

## 2023-04-01 PROCEDURE — 99213 OFFICE O/P EST LOW 20 MIN: CPT

## 2023-04-01 PROCEDURE — 87880 STREP A ASSAY W/OPTIC: CPT

## 2023-04-01 RX ORDER — CEPHALEXIN 500 MG/1
500 CAPSULE ORAL 2 TIMES DAILY
Qty: 20 CAPSULE | Refills: 0 | Status: SHIPPED | OUTPATIENT
Start: 2023-04-01 | End: 2023-04-11

## 2023-04-01 NOTE — PROGRESS NOTES
Assessment & Plan     Strep pharyngitis  Patient presenting with odynophagia, sore throat, without cough and with fever for 2 days duration. Exam reveals Tonsillar exudates. Rapid strep Positive. Culture Not indicated.  Son with positive result 3 days ago. No red flag symptoms including neck pain, difficulty swallowing or breathing.  Will treat with Cephalexin 500 mg every 12 hours for 10 days given penicillin allergy of rash. Discussed Tylenol and ibuprofen for discomfort and fever.  Also discussed patient to return if worsening or new concerning symptoms.   - Streptococcus A Rapid Screen w/Reflex to PCR - Clinic Collect    Return if symptoms worsen or fail to improve.    [facility]    Leo Mcduffie is a 27 year old male who presents to clinic today for the following health issues:  Chief Complaint   Patient presents with     URI     Possible strep, sore throat and fever for 3 days     HPI    URI Adult  Onset of symptoms was 2 day(s) ago.  Course of illness is worsening.    Current and Associated symptoms: fever, chills, sweats, sore throat and headache, odynophagia.   Treatment measures tried include OTC Cough med, Fluids and Rest.  Predisposing factors include ill contact: Family member .  Son tested positive for strep throat on Wednesday.   Denies dizziness, chest pain, shortness of breath, nausea, abdominal pain, diarrhea, rhinorrhea.     Review of Systems  Constitutional, HEENT, cardiovascular, pulmonary, gi and gu systems are negative, except as otherwise noted.      Objective    /69   Pulse 95   Temp 98.3  F (36.8  C) (Oral)   Resp 16   Wt 102 kg (224 lb 12.8 oz)   SpO2 96%   BMI 32.26 kg/m    Physical Exam   GENERAL: alert and no distress  HENT: oropharynx clear, oral mucous membranes moist, tonsillar hypertrophy, tonsillar erythema and tonsillar exudate  NECK: no adenopathy, no asymmetry, masses, or scars and thyroid normal to  palpation  RESP: lungs clear to auscultation - no rales, rhonchi or wheezes  CV: regular rate and rhythm, normal S1 S2, no S3 or S4, no murmur, click or rub, no peripheral edema and peripheral pulses strong  MS: no gross musculoskeletal defects noted, no edema    Results for orders placed or performed in visit on 04/01/23 (from the past 24 hour(s))   Streptococcus A Rapid Screen w/Reflex to PCR - Clinic Collect    Specimen: Throat; Swab   Result Value Ref Range    Group A Strep antigen Positive (A) Negative

## 2023-04-01 NOTE — PATIENT INSTRUCTIONS
Strep Throat     Your rapid strep test was positive today. We will treat with a course of antibiotics. Please complete the full course of antibiotics. You can take your medication with food and with a probiotic such as Culturelle to prevent stomach irritation. You will be contagious for 24 hours following initiation of the medication.    You may use Tylenol or Motrin for pain and fevers.    May use honey, drink warm tea, gargle saline solution, or use throat lozenges to sooth throat pain.    Change toothbrush after 72 hours of starting the antibiotic to prevent reinfection.    Watch for resolution of symptoms in the next few days. If you continue to have high fevers, begin to have difficulty swallowing or breathing, notice worsening neck pain, or difficulty moving neck, please return to clinic or present to the emergency room immediately. Otherwise, follow up with your primary care provider as needed.

## 2023-04-04 ENCOUNTER — VIRTUAL VISIT (OUTPATIENT)
Dept: FAMILY MEDICINE | Facility: CLINIC | Age: 28
End: 2023-04-04
Payer: OTHER GOVERNMENT

## 2023-04-04 DIAGNOSIS — G24.5 EYE TWITCH: Primary | ICD-10-CM

## 2023-04-04 PROCEDURE — 99213 OFFICE O/P EST LOW 20 MIN: CPT | Mod: VID | Performed by: PHYSICIAN ASSISTANT

## 2023-04-04 RX ORDER — AMOXICILLIN 400 MG/5ML
POWDER, FOR SUSPENSION ORAL
COMMUNITY
Start: 2023-02-13 | End: 2023-04-04

## 2023-04-04 NOTE — PROGRESS NOTES
Froy is a 27 year old who is being evaluated via a billable video visit.      How would you like to obtain your AVS? MyChart  If the video visit is dropped, the invitation should be resent by: Text to cell phone: [phone]  Will anyone else be joining your video visit? No        Assessment & Plan     Eye twitch  None noted on visual exam during visit today. Based on upcoming move and no history of similar symptoms, likely eye strain vs stress induced. Recommending formal vision check and if no findings suggesting etiology, consider electrolyte abnormality evaluation. If done and normal, stress management discussed and watch ans wait.   - Adult Eye  Referral; Future        [facility]   Froy is a 27 year old, presenting for the following health issues:  Eye Problem         View : No data to display.              History of Present Illness       Reason for visit:  Eye twitching concerns   Symptom onset:  1-2 weeks ago  Symptoms include:  The bottom of the left eye twitches randomly  Symptom intensity:  Moderate  Symptom progression:  Staying the same  Had these symptoms before:  No    He eats 2-3 servings of fruits and vegetables daily.He consumes 2 sweetened beverage(s) daily.        Eye(s) Problem  Onset/Duration: 2 weeks ago  Description:   Location: Left eye  Pain: No  Redness: No  Accompanying Signs & Symptoms:  Discharge/mattering: No  Swelling: YES  Visual changes: No  Fever: No  Nasal Congestion: No  Bothered by bright lights: No  History:  Trauma: No  Foreign body exposure: No  Wearing contacts: No  Precipitating or alleviating factors: None  Therapies tried and outcome: None       Has never had optometry exam. Is planning on moving to [place] in 4 months and is stressed.     Denies any alcohol or caffeine use. No history of similar symptoms.         Review of Systems   Constitutional, HEENT, cardiovascular, pulmonary, GI, ,  "musculoskeletal, neuro, skin, endocrine and psych systems are negative, except as otherwise noted.      Objective    Vitals - Patient Reported  Systolic (Patient Reported):  (Patient was not able to take vitals today)  Diastolic (Patient Reported):  (Patient was not able to take vitals today)  Weight (Patient Reported): 101.2 kg (223 lb)  Height (Patient Reported): 177.8 cm (5' 10\")  BMI (Based on Pt Reported Ht/Wt): 32  SpO2 (Patient Reported):  (Patient was not able to take vitals today)  Temperature (Patient Reported):  (Patient was not able to take vitals today)  Pulse (Patient Reported):  (Patient was not able to take vitals today)      Vitals:  No vitals were obtained today due to virtual visit.    Physical Exam   GENERAL: Healthy, alert and no distress  EYES: Eyes grossly normal to inspection.  No discharge or erythema, or obvious scleral/conjunctival abnormalities.  RESP: No audible wheeze, cough, or visible cyanosis.  No visible retractions or increased work of breathing.    SKIN: Visible skin clear. No significant rash, abnormal pigmentation or lesions.  NEURO: Cranial nerves grossly intact.  Mentation and speech appropriate for age.  PSYCH: Mentation appears normal, affect normal/bright, judgement and insight intact, normal speech and appearance well-groomed.              Video-Visit Details    Type of service:  Video Visit     Originating Location (pt. Location): Other work  Distant Location (provider location):  On-site  Platform used for Video Visit: [employer]    "

## 2023-04-06 ENCOUNTER — HOSPITAL ENCOUNTER (OUTPATIENT)
Dept: PHYSICAL THERAPY | Facility: REHABILITATION | Age: 28
Discharge: HOME | End: 2023-04-06
Payer: OTHER GOVERNMENT

## 2023-04-06 DIAGNOSIS — M25.60 DECREASED RANGE OF MOTION: ICD-10-CM

## 2023-04-06 DIAGNOSIS — M25.561 ACUTE PAIN OF RIGHT KNEE: Primary | ICD-10-CM

## 2023-04-06 PROCEDURE — 97112 NEUROMUSCULAR REEDUCATION: CPT | Mod: GP

## 2023-04-06 PROCEDURE — 97110 THERAPEUTIC EXERCISES: CPT | Mod: GP

## 2023-04-13 ENCOUNTER — HOSPITAL ENCOUNTER (OUTPATIENT)
Dept: PHYSICAL THERAPY | Facility: REHABILITATION | Age: 28
Discharge: HOME | End: 2023-04-13
Payer: OTHER GOVERNMENT

## 2023-04-13 DIAGNOSIS — M25.561 ACUTE PAIN OF RIGHT KNEE: Primary | ICD-10-CM

## 2023-04-13 DIAGNOSIS — M25.60 DECREASED RANGE OF MOTION: ICD-10-CM

## 2023-04-13 PROCEDURE — 97110 THERAPEUTIC EXERCISES: CPT | Mod: GP

## 2023-04-18 ENCOUNTER — TELEPHONE (OUTPATIENT)
Dept: ORTHOPEDICS | Facility: CLINIC | Age: 28
End: 2023-04-18
Payer: OTHER GOVERNMENT

## 2023-04-18 ENCOUNTER — DOCUMENTATION ONLY (OUTPATIENT)
Dept: ORTHOPEDICS | Facility: CLINIC | Age: 28
End: 2023-04-18
Payer: OTHER GOVERNMENT

## 2023-04-18 NOTE — PROGRESS NOTES
Writer received a call from the call center stating pt calling back in from a message for reschedule of Dr. Carrillo appointment on 4/24/23. Writer looked at the scheduled and had call center offer 8:50 am on 4/24/23.     Brittaney Silva LPN

## 2023-04-19 ENCOUNTER — HOSPITAL ENCOUNTER (OUTPATIENT)
Dept: PHYSICAL THERAPY | Facility: REHABILITATION | Age: 28
Discharge: HOME | End: 2023-04-19
Payer: OTHER GOVERNMENT

## 2023-04-19 DIAGNOSIS — M25.60 DECREASED RANGE OF MOTION: ICD-10-CM

## 2023-04-19 DIAGNOSIS — M25.561 ACUTE PAIN OF RIGHT KNEE: Primary | ICD-10-CM

## 2023-04-19 PROCEDURE — 97112 NEUROMUSCULAR REEDUCATION: CPT | Mod: GP

## 2023-04-19 PROCEDURE — 97110 THERAPEUTIC EXERCISES: CPT | Mod: GP

## 2023-04-24 ENCOUNTER — OFFICE VISIT (OUTPATIENT)
Dept: ORTHOPEDICS | Facility: CLINIC | Age: 28
End: 2023-04-24
Payer: OTHER GOVERNMENT

## 2023-04-24 VITALS — WEIGHT: 224 LBS | BODY MASS INDEX: 32.07 KG/M2 | HEIGHT: 70 IN

## 2023-04-24 DIAGNOSIS — G89.29 CHRONIC PAIN OF RIGHT KNEE: Primary | ICD-10-CM

## 2023-04-24 DIAGNOSIS — M25.561 CHRONIC PAIN OF RIGHT KNEE: Primary | ICD-10-CM

## 2023-04-24 PROCEDURE — 99024 POSTOP FOLLOW-UP VISIT: CPT | Performed by: ORTHOPAEDIC SURGERY

## 2023-04-24 NOTE — LETTER
4/24/2023         RE: Froy Hernández  [address] [address] [address]        Dear Colleague,    Thank you for referring your patient, Froy Hernández, to the [facility] [place]. Please see a copy of my visit note below.    Diagnosis  ACL tear right knee    Procedures:  Examination under anesthesia right knee  Right knee arthroscopy  Single-stage revision ACL reconstruction bone tendon bone autograft      DOS 1/31/23   History:  3-month status post the above surgery.  Doing well.  No pain.  Off opioids.  Doing physical therapy.  Progressing well.    Exam:     General: Awake, Alert, and oriented. Articulates and communicates with a normal affect     Right Lower Extremity:  Incisions well healed without evidence of infection, no erythema, drainage, or wound dehiscence   No post-operative effusion or ecchymosis  Range of motion is full  Lachman 0, no pivot shift  TA/Gsc/EHL/FHL with 5/5 strength  Fullness noted over the anterior medial mid calf.  At approximately musculotendinous junction of the gastrocsoleus complex.  Unclear etiology.  Does not appear vascular in origin.  No tenderness.  Imaging:      Assessment:  12 weeks following revision ACL reconstruction.  Doing well.    Plan:   Weightbearing: WBAT  Range of Motion: No range of motion restrictions.   Acitivity Restrictions:  May do straight line running at this time  No running distance or pace restrictions  No cutting, pivoting, or start-stop running  Goal to build strength, endurance, and confidence to allow sports in 3 months time (6 months from the date of surgery)  Brace: Discussed knee bracing options for sports including neoprene knee sleeve ACL functional bracing.   Discussed post-ACL reconstruction therapy program  Follow up: 3 months no XRays with an ACL functional test as completed by physical therapy.            Again, thank you for allowing me to participate in the care of your patient.         Sincerely,        Alberto Carrillo MD

## 2023-04-24 NOTE — NURSING NOTE
"Reason For Visit:   Chief Complaint   Patient presents with     RECHECK     DOS: 1/31/23 right knee arthroscopy, ACL reconstruction revision BTB autograft, meniscus surgery         ?  No  Occupation: [employer] .  Currently working? Yes.  Work status?  Full time.    Date of surgery: 1/31/2023  Type of surgery:   PROCEDURE:  1. Examination under anesthesia right knee  2. Right knee arthroscopy  3. Single-stage revision ACL reconstruction bone tendon bone autograft    Patient reports that for last week, he has been experiencing an increase of knee comfort over the medial and lateral aspect of his right knee. He reports no new injury. He has continued with physical therapy and HEP. He has progressed all his exercises at physical therapy within the last two weeks. No increase of swelling. But he would like to discus pocket of swelling over his lateral knee and his medial lower leg.     SANE Score  Left Knee: 100  Right Knee: 50-60      Ht 1.778 m (5' 10\")   Wt 101.6 kg (224 lb)   BMI 32.14 kg/m           Allergies   Allergen Reactions     Penicillins Unknown     Other reaction(s): *Unknown       No current outpatient medications on file.     No current facility-administered medications for this visit.         Urvashi Manuel, ATC    "

## 2023-04-24 NOTE — PROGRESS NOTES
Diagnosis  1. ACL tear right knee    Procedures:  1. Examination under anesthesia right knee  2. Right knee arthroscopy  3. Single-stage revision ACL reconstruction bone tendon bone autograft      DOS 1/31/23   History:  3-month status post the above surgery.  Doing well.  No pain.  Off opioids.  Doing physical therapy.  Progressing well.    Exam:     General: Awake, Alert, and oriented. Articulates and communicates with a normal affect     Right Lower Extremity:    Incisions well healed without evidence of infection, no erythema, drainage, or wound dehiscence     No post-operative effusion or ecchymosis    Range of motion is full    Lachman 0, no pivot shift    TA/Gsc/EHL/FHL with 5/5 strength  Fullness noted over the anterior medial mid calf.  At approximately musculotendinous junction of the gastrocsoleus complex.  Unclear etiology.  Does not appear vascular in origin.  No tenderness.  Imaging:      Assessment:  12 weeks following revision ACL reconstruction.  Doing well.    Plan:     Weightbearing: WBAT    Range of Motion: No range of motion restrictions.     Acitivity Restrictions:    May do straight line running at this time    No running distance or pace restrictions    No cutting, pivoting, or start-stop running    Goal to build strength, endurance, and confidence to allow sports in 3 months time (6 months from the date of surgery)    Brace: Discussed knee bracing options for sports including neoprene knee sleeve ACL functional bracing.     Discussed post-ACL reconstruction therapy program    Follow up: 3 months no XRays with an ACL functional test as completed by physical therapy.

## 2023-04-27 ENCOUNTER — HOSPITAL ENCOUNTER (OUTPATIENT)
Dept: PHYSICAL THERAPY | Facility: REHABILITATION | Age: 28
Discharge: HOME | End: 2023-04-27
Payer: OTHER GOVERNMENT

## 2023-04-27 DIAGNOSIS — M25.60 DECREASED RANGE OF MOTION: ICD-10-CM

## 2023-04-27 DIAGNOSIS — M25.561 ACUTE PAIN OF RIGHT KNEE: Primary | ICD-10-CM

## 2023-04-27 PROCEDURE — 97110 THERAPEUTIC EXERCISES: CPT | Mod: GP

## 2023-05-03 ENCOUNTER — HOSPITAL ENCOUNTER (OUTPATIENT)
Dept: PHYSICAL THERAPY | Facility: REHABILITATION | Age: 28
Discharge: HOME | End: 2023-05-03
Payer: OTHER GOVERNMENT

## 2023-05-03 DIAGNOSIS — M25.561 ACUTE PAIN OF RIGHT KNEE: Primary | ICD-10-CM

## 2023-05-03 DIAGNOSIS — M25.60 DECREASED RANGE OF MOTION: ICD-10-CM

## 2023-05-03 PROCEDURE — 97112 NEUROMUSCULAR REEDUCATION: CPT | Mod: GP

## 2023-05-03 PROCEDURE — 97110 THERAPEUTIC EXERCISES: CPT | Mod: GP

## 2023-05-11 ENCOUNTER — TELEPHONE (OUTPATIENT)
Dept: PHYSICAL THERAPY | Facility: REHABILITATION | Age: 28
End: 2023-05-11

## 2023-05-11 NOTE — TELEPHONE ENCOUNTER
I called Froy regarding his missed appointment today, and he said he got caught up at work.    He said that he has been continuing with light-intensity straight line jogging without any pain. He also said his other exercises are going well.    He confirmed his next appointment (5/25/23).

## 2023-06-08 ENCOUNTER — THERAPY VISIT (OUTPATIENT)
Dept: PHYSICAL THERAPY | Facility: REHABILITATION | Age: 28
End: 2023-06-08
Payer: OTHER GOVERNMENT

## 2023-06-08 DIAGNOSIS — M25.561 CHRONIC PAIN OF RIGHT KNEE: Primary | ICD-10-CM

## 2023-06-08 DIAGNOSIS — G89.29 CHRONIC PAIN OF RIGHT KNEE: Primary | ICD-10-CM

## 2023-06-08 PROCEDURE — 97110 THERAPEUTIC EXERCISES: CPT | Mod: GP

## 2023-06-22 ENCOUNTER — THERAPY VISIT (OUTPATIENT)
Dept: PHYSICAL THERAPY | Facility: REHABILITATION | Age: 28
End: 2023-06-22
Payer: OTHER GOVERNMENT

## 2023-06-22 DIAGNOSIS — M25.561 CHRONIC PAIN OF RIGHT KNEE: Primary | ICD-10-CM

## 2023-06-22 DIAGNOSIS — G89.29 CHRONIC PAIN OF RIGHT KNEE: Primary | ICD-10-CM

## 2023-06-22 PROCEDURE — 97110 THERAPEUTIC EXERCISES: CPT | Mod: GP

## 2023-06-22 PROCEDURE — 97750 PHYSICAL PERFORMANCE TEST: CPT | Mod: GP

## 2023-07-13 ENCOUNTER — THERAPY VISIT (OUTPATIENT)
Dept: PHYSICAL THERAPY | Facility: REHABILITATION | Age: 28
End: 2023-07-13
Payer: OTHER GOVERNMENT

## 2023-07-13 DIAGNOSIS — M25.561 CHRONIC PAIN OF RIGHT KNEE: Primary | ICD-10-CM

## 2023-07-13 DIAGNOSIS — G89.29 CHRONIC PAIN OF RIGHT KNEE: Primary | ICD-10-CM

## 2023-07-13 PROCEDURE — 97110 THERAPEUTIC EXERCISES: CPT | Mod: GP

## 2023-07-13 PROCEDURE — 97112 NEUROMUSCULAR REEDUCATION: CPT | Mod: GP

## 2023-07-14 PROBLEM — M25.561 RIGHT KNEE PAIN: Status: RESOLVED | Noted: 2023-02-06 | Resolved: 2023-07-14

## 2023-07-14 NOTE — PROGRESS NOTES
DISCHARGE  Reason for Discharge: Patient was making good progress toward his goals, but he is now [place] out of state.    Equipment Issued: NA    Discharge Plan: Patient to continue home program.    Referring Provider:  Alberto Baumann*           07/13/23 0500   Appointment Info   Signing clinician's name / credentials Baljit Allen, PT   Visits Used 14   Medical Diagnosis Chronic pain of right knee   PT Tx Diagnosis Knee pain and weakness post-op ACL reconstruction   Progress Note/Certification   Start of Care Date 02/06/23   Onset of illness/injury or Date of Surgery 12/30/22   Therapy Frequency 1-2 X week, once daily progressing to 1 x every other week   Predicted Duration 3+ months   Progress Note Due Date 07/18/23   Progress Note Completed Date 06/08/23   PT Goal 1   Goal Identifier Running   Goal Description Froy will be able to run for up to 15 minutes with normal gait pattern and knee pain no higher than 2/10.   Goal Progress Up to 3-4 minutes; pain up to 4/10.   Target Date 08/07/23   PT Goal 2   Goal Identifier Exercise   Goal Description Froy will demonstrate improved tolerance to exercise as evideneced by his ability to participate in desired resistance exercise activities with self-report pain no higher than 2/10.   Goal Progress Still building up to prior level of function but with minimal pain   Target Date 08/07/23   Subjective Report   Subjective Report Froy said that his only real issue at this point is with running, during which he doesn't feel completely confident on his knee, and he gets some pain. He also hasn't tried pushing himself in other higher-level activities, but with daily activities and chores, he reports no issues.   Objective Measures   Objective Measures Objective Measure 1;Objective Measure 2;Objective Measure 3   Objective Measure 1   Objective Measure R knee flexion   Details WNL   Objective Measure 2   Objective Measure R knee extension   Details WNL  "  Objective Measure 3   Objective Measure Pain   Details Since last visit: 4/10   Objective Measure 4   Objective Measure 2-leg Squat   Details 6/8/23: 1 x 20 (>/= 60 degrees) with good control and 0/10 pain   Objective Measure 5   Objective Measure Lower Extremity Physical Performance Testing   Details 6/22/23: Agility test: 66.2%. 6 meter hop test: 53.4%. Crossover hop test: 92.9%. Dynamic stability test: 101.7%. Functional testing (carioca, lateral shuffle, backwards run, high knee skip, short distance sprint): all \"equal/no pain\" at 60-80% effort level, but with min-mod guarding, increased overall effort, and decreased push-off and weight acceptance on involved side due to patient feeling a lack of confidence trusting the involved knee.   Treatment Interventions (PT)   Interventions Therapeutic Procedure/Exercise   Therapeutic Procedure/Exercise   Therapeutic Procedures: strength, endurance, ROM, flexibillity minutes (48919) 10   Ther Proc 1 Eliptical + subjective report   Ther Proc 1 - Details 7 minutes   Ther Proc 2 Discharge planning   Skilled Intervention Facilitation of LE strength and ROM, assist, demo, cues, education   Patient Response/Progress Froy reported mild anterior knee pain following several lengths of jogging, but it alway stopped as soon as he stopped running. He tolerated all other exercises and progressions well. He also tolerated jogging (min though slightly increased speed) in clinic well without any knee pain, though he still demonstrates guarded gait mechanics.   Neuromuscular Re-education   Neuromuscular re-ed of mvmt, balance, coord, kinesthetic sense, posture, proprioception minutes (65465) 31   Patient Response/Progress Froy tolerated exercises well with some mild discomfort with jogging. He required frequent cueing for proper form, correct muscle activation, and avoidance of compensation patterns.   Neuro Re-ed 1 TRX split jumps   Neuro Re-ed 1 - Details 3 x 10 bilateral   Neuro " "Re-ed 2 Single leg ball toss   Neuro Re-ed 2 - Details BOSU (flat side): 3 x 30 seconds on right   Neuro Re-ed 3 TRX mini single leg squats   Neuro Re-ed 3 - Details 3 x 8   Neuro Re-ed 4 Jogging   Neuro Re-ed 4 - Details Light to moderate intensity short-distance jogging with focus on decreased guarding   Neuro Re-ed 5 6-inch step-offs (two-foot landing)   Neuro Re-ed 5 - Details 1 x 12 + 1 x 8; focus on \"soft landing\"/shock absorption and simultaneous foot contact   Neuro Re-ed 6 6-inch jump down   Neuro Re-ed 6 - Details 1 x 4 + 1 x 10; focus on \"soft\" landing/shock absorption and simultaneous foot contact   Education   Learner/Method Patient;Family   Plan   Home program Quad sets, knee extension with OP, heel slides, standing weight shift onto RLE, TKE with TB, hooklying rollouts, SL hip abduction, patellar mobs, scar tissue mob, 3-way SLR   Updates to plan of care Discharge due to move.   Comments   Comments Assessment: Froy's primary complaint at this point is mild pain when attempting to increase his running time or intensity. He is able to perform all daily and work tasks without any pain or limitation. He is still building up his tolerance to his prior level of function with resistance exercises. He demonstrates full right knee extension and flexion AROM. He recently recorded mixed results with lower extremity physical performance testing; with some tests showing equivalent scores between involved and univolved sides and other tests demonstrating remaining functional impairments. He will now be discharged due to a move, and therapist told him to ask his surgeon at next follow-up (7/17/23) whether to pursue further PT in new location. PT thinks this would be beneficial to continue working on running tolerance.   Total Session Time   Timed Code Treatment Minutes 41   Total Treatment Time (sum of timed and untimed services) 41       "

## 2023-07-17 ENCOUNTER — OFFICE VISIT (OUTPATIENT)
Dept: ORTHOPEDICS | Facility: CLINIC | Age: 28
End: 2023-07-17
Payer: OTHER GOVERNMENT

## 2023-07-17 VITALS — HEIGHT: 70 IN | BODY MASS INDEX: 32.07 KG/M2 | WEIGHT: 224 LBS

## 2023-07-17 DIAGNOSIS — M25.561 CHRONIC PAIN OF RIGHT KNEE: Primary | ICD-10-CM

## 2023-07-17 DIAGNOSIS — G89.29 CHRONIC PAIN OF RIGHT KNEE: Primary | ICD-10-CM

## 2023-07-17 PROCEDURE — 99212 OFFICE O/P EST SF 10 MIN: CPT | Mod: GC | Performed by: ORTHOPAEDIC SURGERY

## 2023-07-17 NOTE — PROGRESS NOTES
CHIEF CONCERN:  6 month f/u Right revision ACL reconstruction on 1/31/23 with BTB autograft    HISTORY OF PRESENT ILLNESS: Froy is a 6-month status post revision right ACL reconstruction.  He has started his return to run program with physical therapy.  He notes that after approximately 2 minutes he will start to have medial knee pain that resolves once stopping.  He also notes some swelling in the knee intermittently throughout the day.  He does work as an [employer]  and is being stationed in [place] and will be leaving tomorrow.    PHYSICAL EXAM:    Right knee:  Range of motion: 0-1 30  Ligamentously stable in varus and valgus stress  Stable Lachman  Distal motor and sensory is grossly intact    IMAGING:  No new imaging    ASSESSMENT:  6-month status post revision right ACL reconstruction    PLAN:    Weightbearing: No weight bearing restriction    Range of Motion: No range of motion restrictions.     Acitivity Restrictions:    Begin to return to sports in a structured manner     Goal to return to game competition between 7-10 months    Follow up: As needed.  We did discuss that if he continues having pain around the 10 to 12-month bang it would be reasonable to either return for a visit if he is in the area or pursue further work-up with a local orthopedist in [place].  The patient voiced understanding    Lencho Lucas MD  Orthopedic Surgery PGY5

## 2023-07-17 NOTE — PROGRESS NOTES
Patient seen and examined with the resident. I also personally reviewed the images and interpreted the imaging myself.     Assesment: 6 months following revision acl, doing well      Plan: Weightbearing: No weight bearing restriction    Range of Motion: No range of motion restrictions.     Acitivity Restrictions:    Begin to return to sports in a structured manner     Goal to return to game competition between 7-10 months    Follow up: 1 year with AP/Lateral radiographs. Sooner if problems arise      I agree with history, physical and imaging as well as the assessment and plan as detailed by Dr. Lucas.

## 2023-07-17 NOTE — NURSING NOTE
"Reason For Visit:   Chief Complaint   Patient presents with     RECHECK     DOS: 1/31/23 right knee arthroscopy, revision BTB ACL reconstruction meniscus surgery     Date of surgery: 1/31/23  Type of surgery:   PROCEDURE:  1. Examination under anesthesia right knee  2. Right knee arthroscopy  Single-stage revision ACL reconstruction bone tendon bone autograft    Overall doing well. He hasn't been able to run yet, he gets pain on his medial knee with running. He also has constant swelling on his lateral knee.     SANE Score  Left Knee: 100  Right Knee: 70    Pain Assessment  Patient Currently in Pain: Denies    Ht 1.778 m (5' 10\")   Wt 101.6 kg (224 lb)   BMI 32.14 kg/m           Allergies   Allergen Reactions     Penicillins Unknown     Other reaction(s): *Unknown       No current outpatient medications on file.     No current facility-administered medications for this visit.         Juana Reardon, ATC    "

## 2023-07-17 NOTE — LETTER
7/17/2023         RE: Froy Hernández  [address] [facility] [address]        Dear Colleague,    Thank you for referring your patient, Froy Hernández, to the [facility] [place]. Please see a copy of my visit note below.    CHIEF CONCERN:  6 month f/u Right revision ACL reconstruction on 1/31/23 with BTB autograft    HISTORY OF PRESENT ILLNESS: Froy is a 6-month status post revision right ACL reconstruction.  He has started his return to run program with physical therapy.  He notes that after approximately 2 minutes he will start to have medial knee pain that resolves once stopping.  He also notes some swelling in the knee intermittently throughout the day.  He does work as an [employer]  and is being stationed in [place] and will be leaving tomorrow.    PHYSICAL EXAM:    Right knee:  Range of motion: 0-1 30  Ligamentously stable in varus and valgus stress  Stable Lachman  Distal motor and sensory is grossly intact    IMAGING:  No new imaging    ASSESSMENT:  6-month status post revision right ACL reconstruction    PLAN:  Weightbearing: No weight bearing restriction  Range of Motion: No range of motion restrictions.   Acitivity Restrictions:  Begin to return to sports in a structured manner   Goal to return to game competition between 7-10 months    Follow up: As needed.  We did discuss that if he continues having pain around the 10 to 12-month bang it would be reasonable to either return for a visit if he is in the area or pursue further work-up with a local orthopedist in [place].  The patient voiced understanding    Lencho Lucas MD  Orthopedic Surgery PGY5            Patient seen and examined with the resident. I also personally reviewed the images and interpreted the imaging myself.     Assesment: 6 months following revision acl, doing well    Plan: Weightbearing: No weight bearing restriction  Range of Motion: No range of motion restrictions.   Acitivity  Restrictions:  Begin to return to sports in a structured manner   Goal to return to game competition between 7-10 months    Follow up: 1 year with AP/Lateral radiographs. Sooner if problems arise      I agree with history, physical and imaging as well as the assessment and plan as detailed by Dr. Lucas.         Again, thank you for allowing me to participate in the care of your patient.        Sincerely,        Alberto Carrillo MD

## 2024-03-03 ENCOUNTER — HEALTH MAINTENANCE LETTER (OUTPATIENT)
Age: 29
End: 2024-03-03

## 2025-03-16 ENCOUNTER — HEALTH MAINTENANCE LETTER (OUTPATIENT)
Age: 30
End: 2025-03-16

## (undated) DEVICE — LINEN ORTHO PACK 5446

## (undated) DEVICE — SU FIBERWIRE 0 38" BLUE 22.2MM W/TAPER NDL  AR-7250

## (undated) DEVICE — SU ETHIBOND 5 V-37 4X30" MB66G

## (undated) DEVICE — PREP CHLORAPREP 26ML TINTED ORANGE  260815

## (undated) DEVICE — SU VICRYL 2-0 CT-1 27" UND J259H

## (undated) DEVICE — PACK ACL SUPPLEMENT CUSTOM ASC

## (undated) DEVICE — SU FIBERWIRE 2 38"  AR-7200

## (undated) DEVICE — BLADE SAW OSCILLATING STRK MICRO 9X10X0.51MM 2296-033-220

## (undated) DEVICE — SU MONOCRYL 3-0 PS-1 27" Y936H

## (undated) DEVICE — SU ETHILON 3-0 PS-1 18" 1663H

## (undated) DEVICE — GLOVE BIOGEL PI MICRO INDICATOR UNDERGLOVE SZ 8.0 48980

## (undated) DEVICE — PACK ARTHROSCOPY CUSTOM ASC

## (undated) DEVICE — GLOVE BIOGEL PI MICRO SZ 6.0 48560

## (undated) DEVICE — TUBING SYSTEM ARTHREX PATIENT REDEUCE AR-6421

## (undated) DEVICE — REAMER ARTHREX LOW PROFILE 10MM  AR-1410LP

## (undated) DEVICE — SOL NACL 0.9% IRRIG 3000ML BAG 2B7477

## (undated) DEVICE — PIN GUIDE ARTHREX 2.4MM DRILL  AR-1250L

## (undated) DEVICE — ESU PENCIL SMOKE EVAC W/ROCKER SWITCH 0703-047-000

## (undated) DEVICE — GLOVE BIOGEL PI MICRO INDICATOR UNDERGLOVE SZ 6.5 48965

## (undated) DEVICE — PAD ARMBOARD FOAM EGGCRATE COVIDEN 3114367

## (undated) DEVICE — ABLATOR ARTHREX APOLLO RF MP90 ASPIRATING 90DEG AR-9811

## (undated) DEVICE — IMM KNEE 20" 79-80020

## (undated) DEVICE — LINEN GOWN XLG 5407

## (undated) DEVICE — BUR ARTHREX COOLCUT SABRE 4.0MMX13CM AR-8400SR

## (undated) DEVICE — Device

## (undated) DEVICE — SU LOOP #2 TIGERLOOP AR-7234T

## (undated) DEVICE — ESU GROUND PAD ADULT W/CORD E7507

## (undated) DEVICE — PEN MARKING SKIN W/PAPER RULER 31145785

## (undated) DEVICE — SU ETHIBOND 1 CT-1 30" X425H

## (undated) DEVICE — GLOVE BIOGEL PI MICRO SZ 8.0 48580

## (undated) DEVICE — SUCTION MANIFOLD NEPTUNE 2 SYS 4 PORT 0702-020-000

## (undated) DEVICE — SU VICRYL 2-0 CT-2 27" UND J269H

## (undated) DEVICE — DRSG STERI STRIP 1/2X4" R1547

## (undated) RX ORDER — PROPOFOL 10 MG/ML
INJECTION, EMULSION INTRAVENOUS
Status: DISPENSED
Start: 2023-01-31

## (undated) RX ORDER — GLYCOPYRROLATE 0.2 MG/ML
INJECTION INTRAMUSCULAR; INTRAVENOUS
Status: DISPENSED
Start: 2023-01-31

## (undated) RX ORDER — KETOROLAC TROMETHAMINE 30 MG/ML
INJECTION, SOLUTION INTRAMUSCULAR; INTRAVENOUS
Status: DISPENSED
Start: 2023-01-31

## (undated) RX ORDER — ACETAMINOPHEN 325 MG/1
TABLET ORAL
Status: DISPENSED
Start: 2023-01-31

## (undated) RX ORDER — ONDANSETRON 2 MG/ML
INJECTION INTRAMUSCULAR; INTRAVENOUS
Status: DISPENSED
Start: 2023-01-31

## (undated) RX ORDER — HYDROMORPHONE HYDROCHLORIDE 1 MG/ML
INJECTION, SOLUTION INTRAMUSCULAR; INTRAVENOUS; SUBCUTANEOUS
Status: DISPENSED
Start: 2023-01-31

## (undated) RX ORDER — CEFAZOLIN SODIUM 2 G/50ML
SOLUTION INTRAVENOUS
Status: DISPENSED
Start: 2023-01-31

## (undated) RX ORDER — CEFAZOLIN SODIUM 1 G/3ML
INJECTION, POWDER, FOR SOLUTION INTRAMUSCULAR; INTRAVENOUS
Status: DISPENSED
Start: 2023-01-31

## (undated) RX ORDER — FENTANYL CITRATE 50 UG/ML
INJECTION, SOLUTION INTRAMUSCULAR; INTRAVENOUS
Status: DISPENSED
Start: 2023-01-31

## (undated) RX ORDER — DEXAMETHASONE SODIUM PHOSPHATE 4 MG/ML
INJECTION, SOLUTION INTRA-ARTICULAR; INTRALESIONAL; INTRAMUSCULAR; INTRAVENOUS; SOFT TISSUE
Status: DISPENSED
Start: 2023-01-31

## (undated) RX ORDER — OXYCODONE HYDROCHLORIDE 5 MG/1
TABLET ORAL
Status: DISPENSED
Start: 2023-01-31